# Patient Record
Sex: MALE | Race: WHITE | NOT HISPANIC OR LATINO | URBAN - METROPOLITAN AREA
[De-identification: names, ages, dates, MRNs, and addresses within clinical notes are randomized per-mention and may not be internally consistent; named-entity substitution may affect disease eponyms.]

---

## 2017-05-11 ENCOUNTER — OUTPATIENT (OUTPATIENT)
Dept: OUTPATIENT SERVICES | Facility: HOSPITAL | Age: 62
LOS: 1 days | End: 2017-05-11
Payer: MEDICARE

## 2017-05-11 DIAGNOSIS — M54.16 RADICULOPATHY, LUMBAR REGION: ICD-10-CM

## 2017-05-11 PROCEDURE — 62323 NJX INTERLAMINAR LMBR/SAC: CPT

## 2017-05-11 PROCEDURE — 77003 FLUOROGUIDE FOR SPINE INJECT: CPT

## 2017-06-26 ENCOUNTER — RX RENEWAL (OUTPATIENT)
Age: 62
End: 2017-06-26

## 2017-10-06 ENCOUNTER — RX RENEWAL (OUTPATIENT)
Age: 62
End: 2017-10-06

## 2018-01-08 ENCOUNTER — RX RENEWAL (OUTPATIENT)
Age: 63
End: 2018-01-08

## 2018-04-09 ENCOUNTER — RX RENEWAL (OUTPATIENT)
Age: 63
End: 2018-04-09

## 2018-04-19 ENCOUNTER — NON-APPOINTMENT (OUTPATIENT)
Age: 63
End: 2018-04-19

## 2018-04-19 ENCOUNTER — APPOINTMENT (OUTPATIENT)
Dept: INTERNAL MEDICINE | Facility: CLINIC | Age: 63
End: 2018-04-19
Payer: MEDICARE

## 2018-04-19 VITALS
SYSTOLIC BLOOD PRESSURE: 130 MMHG | HEART RATE: 81 BPM | DIASTOLIC BLOOD PRESSURE: 88 MMHG | TEMPERATURE: 97.6 F | HEIGHT: 73 IN | WEIGHT: 236 LBS | BODY MASS INDEX: 31.28 KG/M2 | OXYGEN SATURATION: 98 %

## 2018-04-19 DIAGNOSIS — Z00.00 ENCOUNTER FOR GENERAL ADULT MEDICAL EXAMINATION W/OUT ABNORMAL FINDINGS: ICD-10-CM

## 2018-04-19 DIAGNOSIS — Z82.49 FAMILY HISTORY OF ISCHEMIC HEART DISEASE AND OTHER DISEASES OF THE CIRCULATORY SYSTEM: ICD-10-CM

## 2018-04-19 DIAGNOSIS — Z82.3 FAMILY HISTORY OF STROKE: ICD-10-CM

## 2018-04-19 DIAGNOSIS — Z78.9 OTHER SPECIFIED HEALTH STATUS: ICD-10-CM

## 2018-04-19 PROCEDURE — 93000 ELECTROCARDIOGRAM COMPLETE: CPT

## 2018-04-19 PROCEDURE — G0439: CPT

## 2018-05-03 ENCOUNTER — APPOINTMENT (OUTPATIENT)
Dept: GASTROENTEROLOGY | Facility: CLINIC | Age: 63
End: 2018-05-03
Payer: MEDICARE

## 2018-05-03 VITALS
HEART RATE: 89 BPM | SYSTOLIC BLOOD PRESSURE: 130 MMHG | DIASTOLIC BLOOD PRESSURE: 82 MMHG | TEMPERATURE: 98.6 F | RESPIRATION RATE: 98 BRPM

## 2018-05-03 DIAGNOSIS — Z87.442 PERSONAL HISTORY OF URINARY CALCULI: ICD-10-CM

## 2018-05-03 PROCEDURE — 99203 OFFICE O/P NEW LOW 30 MIN: CPT

## 2018-05-03 RX ORDER — IBUPROFEN 800 MG/1
800 TABLET, FILM COATED ORAL
Qty: 60 | Refills: 3 | Status: DISCONTINUED | COMMUNITY
Start: 2018-04-19 | End: 2018-05-03

## 2018-05-10 ENCOUNTER — APPOINTMENT (OUTPATIENT)
Dept: GASTROENTEROLOGY | Facility: AMBULATORY MEDICAL SERVICES | Age: 63
End: 2018-05-10
Payer: MEDICARE

## 2018-05-10 PROCEDURE — G0121 COLON CA SCRN NOT HI RSK IND: CPT | Mod: PT

## 2018-05-10 PROCEDURE — 43239 EGD BIOPSY SINGLE/MULTIPLE: CPT

## 2018-05-14 ENCOUNTER — RX RENEWAL (OUTPATIENT)
Age: 63
End: 2018-05-14

## 2018-05-15 ENCOUNTER — APPOINTMENT (OUTPATIENT)
Dept: GASTROENTEROLOGY | Facility: AMBULATORY MEDICAL SERVICES | Age: 63
End: 2018-05-15

## 2018-05-22 ENCOUNTER — APPOINTMENT (OUTPATIENT)
Dept: INTERNAL MEDICINE | Facility: CLINIC | Age: 63
End: 2018-05-22
Payer: MEDICARE

## 2018-05-22 ENCOUNTER — TRANSCRIPTION ENCOUNTER (OUTPATIENT)
Age: 63
End: 2018-05-22

## 2018-05-22 VITALS
WEIGHT: 230 LBS | HEIGHT: 73 IN | DIASTOLIC BLOOD PRESSURE: 70 MMHG | HEART RATE: 82 BPM | TEMPERATURE: 98.2 F | OXYGEN SATURATION: 98 % | BODY MASS INDEX: 30.48 KG/M2 | SYSTOLIC BLOOD PRESSURE: 120 MMHG

## 2018-05-22 PROCEDURE — 99214 OFFICE O/P EST MOD 30 MIN: CPT

## 2018-05-22 RX ORDER — METHYLPREDNISOLONE 4 MG/1
4 TABLET ORAL
Qty: 21 | Refills: 0 | Status: COMPLETED | COMMUNITY
Start: 2018-01-31

## 2018-05-22 RX ORDER — SODIUM SULFATE, POTASSIUM SULFATE, MAGNESIUM SULFATE 17.5; 3.13; 1.6 G/ML; G/ML; G/ML
17.5-3.13-1.6 SOLUTION, CONCENTRATE ORAL
Qty: 1 | Refills: 0 | Status: COMPLETED | COMMUNITY
Start: 2018-05-03 | End: 2018-05-22

## 2018-05-22 NOTE — ASSESSMENT
[Patient Optimized for Surgery] : Patient optimized for surgery [No Further Testing Recommended] : no further testing recommended [FreeTextEntry6] : Patient advised to avoid all over-the-counter Aspirin or NSAID medications.

## 2018-05-22 NOTE — PHYSICAL EXAM
[No Acute Distress] : no acute distress [Well Nourished] : well nourished [Well Developed] : well developed [Well-Appearing] : well-appearing [Normal Sclera/Conjunctiva] : normal sclera/conjunctiva [PERRL] : pupils equal round and reactive to light [EOMI] : extraocular movements intact [Normal Outer Ear/Nose] : the outer ears and nose were normal in appearance [Normal Oropharynx] : the oropharynx was normal [No JVD] : no jugular venous distention [Supple] : supple [No Lymphadenopathy] : no lymphadenopathy [Thyroid Normal, No Nodules] : the thyroid was normal and there were no nodules present [No Respiratory Distress] : no respiratory distress  [Clear to Auscultation] : lungs were clear to auscultation bilaterally [No Accessory Muscle Use] : no accessory muscle use [Normal Rate] : normal rate  [Regular Rhythm] : with a regular rhythm [Normal S1, S2] : normal S1 and S2 [No Murmur] : no murmur heard [No Carotid Bruits] : no carotid bruits [No Edema] : there was no peripheral edema [Soft] : abdomen soft [Non Tender] : non-tender [Non-distended] : non-distended [No Masses] : no abdominal mass palpated [No HSM] : no HSM [Normal Bowel Sounds] : normal bowel sounds [Normal Posterior Cervical Nodes] : no posterior cervical lymphadenopathy [Normal Anterior Cervical Nodes] : no anterior cervical lymphadenopathy [No CVA Tenderness] : no CVA  tenderness [No Spinal Tenderness] : no spinal tenderness [No Joint Swelling] : no joint swelling [Grossly Normal Strength/Tone] : grossly normal strength/tone [No Rash] : no rash [Normal Gait] : normal gait [Coordination Grossly Intact] : coordination grossly intact [No Focal Deficits] : no focal deficits [Deep Tendon Reflexes (DTR)] : deep tendon reflexes were 2+ and symmetric [Normal Affect] : the affect was normal [Normal Insight/Judgement] : insight and judgment were intact

## 2018-05-22 NOTE — CONSULT LETTER
[Courtesy Letter:] : I had the pleasure of seeing your patient, [unfilled], in my office today. [Please see my note below.] : Please see my note below. [Consult Closing:] : Thank you very much for allowing me to participate in the care of this patient.  If you have any questions, please do not hesitate to contact me.

## 2018-05-22 NOTE — HISTORY OF PRESENT ILLNESS
[( Patient denies any chest pain, claudication, dyspnea on exertion, orthopnea, palpitations or syncope )] : Patient denies any chest pain, claudication, dyspnea on exertion, orthopnea, palpitations or syncope. [Coronary Artery Disease] : no coronary artery disease [Diabetes] : no diabetes [Sleep Apnea] : no sleep apnea [COPD] : no COPD [Previous Adverse Anesthesia Reaction] : no previous adverse anesthesia reaction [FreeTextEntry1] : Arthroscopy of right shoulder [FreeTextEntry2] : 5/30/2018 [FreeTextEntry3] : Dr. Heraclio Mary

## 2018-05-22 NOTE — RESULTS/DATA
[] : results reviewed [de-identified] : within normal limits [de-identified] : within normal limits [de-identified] : Normal sinus rhythm @ 67bpm, incomplete RBBB, no acute st-t wave changes.

## 2018-06-05 ENCOUNTER — APPOINTMENT (OUTPATIENT)
Dept: GASTROENTEROLOGY | Facility: CLINIC | Age: 63
End: 2018-06-05
Payer: MEDICARE

## 2018-06-05 VITALS
HEART RATE: 81 BPM | BODY MASS INDEX: 30.88 KG/M2 | SYSTOLIC BLOOD PRESSURE: 136 MMHG | HEIGHT: 73 IN | OXYGEN SATURATION: 98 % | DIASTOLIC BLOOD PRESSURE: 88 MMHG | WEIGHT: 233 LBS | TEMPERATURE: 98.3 F

## 2018-06-05 PROCEDURE — 99213 OFFICE O/P EST LOW 20 MIN: CPT

## 2018-06-05 RX ORDER — OXYCODONE AND ACETAMINOPHEN 5; 325 MG/1; MG/1
5-325 TABLET ORAL
Qty: 20 | Refills: 0 | Status: DISCONTINUED | COMMUNITY
Start: 2018-05-21 | End: 2018-06-05

## 2018-07-02 ENCOUNTER — RX RENEWAL (OUTPATIENT)
Age: 63
End: 2018-07-02

## 2018-08-09 ENCOUNTER — RX RENEWAL (OUTPATIENT)
Age: 63
End: 2018-08-09

## 2018-12-06 ENCOUNTER — APPOINTMENT (OUTPATIENT)
Dept: INTERNAL MEDICINE | Facility: CLINIC | Age: 63
End: 2018-12-06
Payer: MEDICARE

## 2018-12-06 VITALS
WEIGHT: 228 LBS | HEART RATE: 78 BPM | BODY MASS INDEX: 30.22 KG/M2 | DIASTOLIC BLOOD PRESSURE: 80 MMHG | HEIGHT: 73 IN | TEMPERATURE: 97.8 F | SYSTOLIC BLOOD PRESSURE: 130 MMHG | OXYGEN SATURATION: 98 %

## 2018-12-06 DIAGNOSIS — M25.512 PAIN IN LEFT SHOULDER: ICD-10-CM

## 2018-12-06 DIAGNOSIS — Z12.11 ENCOUNTER FOR SCREENING FOR MALIGNANT NEOPLASM OF COLON: ICD-10-CM

## 2018-12-06 PROCEDURE — 99215 OFFICE O/P EST HI 40 MIN: CPT

## 2018-12-06 NOTE — REVIEW OF SYSTEMS
[Negative] : Heme/Lymph [Joint Pain] : joint pain [Joint Stiffness] : joint stiffness [Back Pain] : back pain

## 2018-12-06 NOTE — HISTORY OF PRESENT ILLNESS
[No Pertinent Cardiac History] : no history of aortic stenosis, atrial fibrillation, coronary artery disease, recent myocardial infarction, or implantable device/pacemaker [No Pertinent Pulmonary History] : no history of asthma, COPD, sleep apnea, or smoking [No Adverse Anesthesia Reaction] : no adverse anesthesia reaction in self or family member [(Patient denies any chest pain, claudication, dyspnea on exertion, orthopnea, palpitations or syncope)] : Patient denies any chest pain, claudication, dyspnea on exertion, orthopnea, palpitations or syncope [Diabetes] : diabetes [Chronic Anticoagulation] : no chronic anticoagulation [Chronic Kidney Disease] : no chronic kidney disease [FreeTextEntry1] : right rotator cuff  repair [FreeTextEntry2] : 12/11/18 [FreeTextEntry3] : Dr. Chow

## 2018-12-06 NOTE — ASSESSMENT
[Patient Optimized for Surgery] : Patient optimized for surgery [No Further Testing Recommended] : no further testing recommended [Continue medications as is] : Continue current medications

## 2018-12-06 NOTE — RESULTS/DATA
[] : results reviewed [de-identified] : within normal limits [de-identified] : within normal limits [de-identified] : NSR@ 71 bpm, incomplete RBBB, no acute st-t wave changes.

## 2018-12-06 NOTE — PHYSICAL EXAM
[No Acute Distress] : no acute distress [Well Nourished] : well nourished [Well Developed] : well developed [Well-Appearing] : well-appearing [Normal Sclera/Conjunctiva] : normal sclera/conjunctiva [PERRL] : pupils equal round and reactive to light [EOMI] : extraocular movements intact [Normal Outer Ear/Nose] : the outer ears and nose were normal in appearance [Normal Oropharynx] : the oropharynx was normal [Normal TMs] : both tympanic membranes were normal [No JVD] : no jugular venous distention [Supple] : supple [No Lymphadenopathy] : no lymphadenopathy [Thyroid Normal, No Nodules] : the thyroid was normal and there were no nodules present [No Respiratory Distress] : no respiratory distress  [Clear to Auscultation] : lungs were clear to auscultation bilaterally [No Accessory Muscle Use] : no accessory muscle use [Normal Rate] : normal rate  [Regular Rhythm] : with a regular rhythm [Normal S1, S2] : normal S1 and S2 [No Murmur] : no murmur heard [No Carotid Bruits] : no carotid bruits [No Varicosities] : no varicosities [Pedal Pulses Present] : the pedal pulses are present [No Edema] : there was no peripheral edema [No Extremity Clubbing/Cyanosis] : no extremity clubbing/cyanosis [Soft] : abdomen soft [Non Tender] : non-tender [Non-distended] : non-distended [No Masses] : no abdominal mass palpated [No HSM] : no HSM [Normal Bowel Sounds] : normal bowel sounds [Normal Posterior Cervical Nodes] : no posterior cervical lymphadenopathy [Normal Anterior Cervical Nodes] : no anterior cervical lymphadenopathy [No CVA Tenderness] : no CVA  tenderness [No Spinal Tenderness] : no spinal tenderness [No Joint Swelling] : no joint swelling [Grossly Normal Strength/Tone] : grossly normal strength/tone [No Rash] : no rash [Normal Gait] : normal gait [Coordination Grossly Intact] : coordination grossly intact [No Focal Deficits] : no focal deficits [Normal Affect] : the affect was normal [Alert and Oriented x3] : oriented to person, place, and time [Normal Insight/Judgement] : insight and judgment were intact [de-identified] : limited range of motion in neck and right shoulder

## 2018-12-06 NOTE — CONSULT LETTER
[Dear  ___] : Dear  [unfilled], [Courtesy Letter:] : I had the pleasure of seeing your patient, [unfilled], in my office today. [Please see my note below.] : Please see my note below. [Consult Closing:] : Thank you very much for allowing me to participate in the care of this patient.  If you have any questions, please do not hesitate to contact me. [Sincerely,] : Sincerely,

## 2018-12-25 ENCOUNTER — RX RENEWAL (OUTPATIENT)
Age: 63
End: 2018-12-25

## 2019-05-23 ENCOUNTER — OUTPATIENT (OUTPATIENT)
Dept: OUTPATIENT SERVICES | Facility: HOSPITAL | Age: 64
LOS: 1 days | End: 2019-05-23

## 2019-05-23 ENCOUNTER — TRANSCRIPTION ENCOUNTER (OUTPATIENT)
Age: 64
End: 2019-05-23

## 2019-06-19 ENCOUNTER — APPOINTMENT (OUTPATIENT)
Dept: DERMATOLOGY | Facility: CLINIC | Age: 64
End: 2019-06-19

## 2019-06-20 ENCOUNTER — OUTPATIENT (OUTPATIENT)
Dept: OUTPATIENT SERVICES | Facility: HOSPITAL | Age: 64
LOS: 1 days | End: 2019-06-20

## 2019-07-18 ENCOUNTER — OUTPATIENT (OUTPATIENT)
Dept: OUTPATIENT SERVICES | Facility: HOSPITAL | Age: 64
LOS: 1 days | End: 2019-07-18

## 2019-12-23 ENCOUNTER — RX RENEWAL (OUTPATIENT)
Age: 64
End: 2019-12-23

## 2020-01-20 ENCOUNTER — RX RENEWAL (OUTPATIENT)
Age: 65
End: 2020-01-20

## 2020-02-18 ENCOUNTER — RX RENEWAL (OUTPATIENT)
Age: 65
End: 2020-02-18

## 2020-04-22 ENCOUNTER — RX RENEWAL (OUTPATIENT)
Age: 65
End: 2020-04-22

## 2020-06-22 ENCOUNTER — RX RENEWAL (OUTPATIENT)
Age: 65
End: 2020-06-22

## 2020-10-19 ENCOUNTER — APPOINTMENT (OUTPATIENT)
Dept: DERMATOLOGY | Facility: CLINIC | Age: 65
End: 2020-10-19

## 2020-11-28 ENCOUNTER — OUTPATIENT (OUTPATIENT)
Dept: OUTPATIENT SERVICES | Facility: HOSPITAL | Age: 65
LOS: 1 days | End: 2020-11-28

## 2021-01-10 ENCOUNTER — APPOINTMENT (OUTPATIENT)
Dept: DISASTER EMERGENCY | Facility: CLINIC | Age: 66
End: 2021-01-10

## 2021-01-12 LAB — SARS-COV-2 N GENE NPH QL NAA+PROBE: NOT DETECTED

## 2021-01-13 ENCOUNTER — OUTPATIENT (OUTPATIENT)
Dept: OUTPATIENT SERVICES | Facility: HOSPITAL | Age: 66
LOS: 1 days | End: 2021-01-13

## 2021-08-30 ENCOUNTER — OUTPATIENT (OUTPATIENT)
Dept: OUTPATIENT SERVICES | Facility: HOSPITAL | Age: 66
LOS: 1 days | End: 2021-08-30

## 2022-04-28 ENCOUNTER — EMERGENCY (EMERGENCY)
Facility: HOSPITAL | Age: 67
LOS: 1 days | Discharge: HOME | End: 2022-04-28
Admitting: EMERGENCY MEDICINE
Payer: COMMERCIAL

## 2022-04-28 DIAGNOSIS — E11.9 TYPE 2 DIABETES MELLITUS WITHOUT COMPLICATIONS: ICD-10-CM

## 2022-04-28 DIAGNOSIS — R42 DIZZINESS AND GIDDINESS: ICD-10-CM

## 2022-04-28 DIAGNOSIS — R00.2 PALPITATIONS: ICD-10-CM

## 2022-04-28 PROCEDURE — 93010 ELECTROCARDIOGRAM REPORT: CPT

## 2022-04-28 PROCEDURE — 99285 EMERGENCY DEPT VISIT HI MDM: CPT | Mod: FS

## 2022-04-28 PROCEDURE — 99284 EMERGENCY DEPT VISIT MOD MDM: CPT

## 2022-04-28 PROCEDURE — 71045 X-RAY EXAM CHEST 1 VIEW: CPT | Mod: 26

## 2022-05-02 ENCOUNTER — NON-APPOINTMENT (OUTPATIENT)
Age: 67
End: 2022-05-02

## 2022-05-02 ENCOUNTER — APPOINTMENT (OUTPATIENT)
Dept: ELECTROPHYSIOLOGY | Facility: CLINIC | Age: 67
End: 2022-05-02
Payer: MEDICARE

## 2022-05-02 ENCOUNTER — APPOINTMENT (OUTPATIENT)
Dept: CARDIOLOGY | Facility: CLINIC | Age: 67
End: 2022-05-02
Payer: MEDICARE

## 2022-05-02 VITALS
HEART RATE: 122 BPM | TEMPERATURE: 97.8 F | OXYGEN SATURATION: 97 % | DIASTOLIC BLOOD PRESSURE: 60 MMHG | BODY MASS INDEX: 28.23 KG/M2 | WEIGHT: 213 LBS | HEIGHT: 73 IN | SYSTOLIC BLOOD PRESSURE: 80 MMHG

## 2022-05-02 VITALS
SYSTOLIC BLOOD PRESSURE: 80 MMHG | TEMPERATURE: 97.8 F | BODY MASS INDEX: 28.1 KG/M2 | HEART RATE: 122 BPM | DIASTOLIC BLOOD PRESSURE: 60 MMHG | OXYGEN SATURATION: 97 % | WEIGHT: 213 LBS

## 2022-05-02 DIAGNOSIS — M75.101 UNSPECIFIED ROTATOR CUFF TEAR OR RUPTURE OF RIGHT SHOULDER, NOT SPECIFIED AS TRAUMATIC: ICD-10-CM

## 2022-05-02 DIAGNOSIS — Z01.818 ENCOUNTER FOR OTHER PREPROCEDURAL EXAMINATION: ICD-10-CM

## 2022-05-02 DIAGNOSIS — Z87.39 PERSONAL HISTORY OF OTHER DISEASES OF THE MUSCULOSKELETAL SYSTEM AND CONNECTIVE TISSUE: ICD-10-CM

## 2022-05-02 DIAGNOSIS — G89.29 PERSONAL HISTORY OF OTHER DISEASES OF THE MUSCULOSKELETAL SYSTEM AND CONNECTIVE TISSUE: ICD-10-CM

## 2022-05-02 DIAGNOSIS — Z87.19 PERSONAL HISTORY OF OTHER DISEASES OF THE DIGESTIVE SYSTEM: ICD-10-CM

## 2022-05-02 DIAGNOSIS — M25.511 PAIN IN RIGHT SHOULDER: ICD-10-CM

## 2022-05-02 PROCEDURE — 99215 OFFICE O/P EST HI 40 MIN: CPT

## 2022-05-02 PROCEDURE — 93000 ELECTROCARDIOGRAM COMPLETE: CPT

## 2022-05-02 PROCEDURE — 99204 OFFICE O/P NEW MOD 45 MIN: CPT

## 2022-05-02 RX ORDER — METOPROLOL SUCCINATE 25 MG/1
25 TABLET, EXTENDED RELEASE ORAL DAILY
Qty: 30 | Refills: 3 | Status: DISCONTINUED | COMMUNITY
End: 2022-05-02

## 2022-05-02 RX ORDER — METFORMIN HYDROCHLORIDE 500 MG/1
500 TABLET, COATED ORAL
Qty: 90 | Refills: 0 | Status: ACTIVE | COMMUNITY

## 2022-05-02 NOTE — ASSESSMENT
[FreeTextEntry1] : Paroxysmal atrial flutter -no room to increase beta-blocker or calcium channel blocker given blood pressure systolic 80 .  This option for him is probably ablation; I have referred him to Dr. Cody for EP evaluation; we will start him on amiodarone if needed for rate control..  His TSH was 2.38 in the hospital.  His wife denies signal symptoms of sleep apnea syndrome.  He will need normal invasive CAD work-up.  Echocardiogram has been requested.\par \par Diabetes -long-term goals blood pressure less than 130/80, A1c less than 7, LDL 70; diabetic diet; continue medications.  SGLT 2 inhibitor should be considered for cardiovascular effects.\par \par Dyslipidemia -low-cholesterol, continue medications.\par \par Overweight status -weight reduction diet and exercise.\par \par Risk factor modifier has been discussed with him at great length including regular walking, compliance to medication, low-cholesterol/low-salt/diabetic diet.  He will be reeval by me after cardiac testing.

## 2022-05-02 NOTE — HISTORY OF PRESENT ILLNESS
[FreeTextEntry1] : 66 years old  gentleman with history of type 2 diabetes, dyslipidemia, history of VSD repair at at age 8 came for urgent cardiac follow-up after discharge from a [facility], still having tachycardia and palpitations.\par \par He was seen in [facility] on April 28, 2022, I have reviewed the hospital records; he was admitted for tachycardia was found to be in atrial fibrillation converted normal sinus spontaneously, he was started on Eliquis and he had appointment to see me today.  Early 4 AM he developed tachycardia, palpitations.  He has no chest pain..\par \par Denies PND, orthopnea, diaphoresis, dizziness, claudication, pedal edema..\par \par No pressure CHF, MI, syncope.

## 2022-05-09 ENCOUNTER — NON-APPOINTMENT (OUTPATIENT)
Age: 67
End: 2022-05-09

## 2022-05-10 ENCOUNTER — APPOINTMENT (OUTPATIENT)
Dept: CARDIOLOGY | Facility: CLINIC | Age: 67
End: 2022-05-10
Payer: MEDICARE

## 2022-05-10 ENCOUNTER — NON-APPOINTMENT (OUTPATIENT)
Age: 67
End: 2022-05-10

## 2022-05-10 VITALS
OXYGEN SATURATION: 97 % | HEIGHT: 73 IN | SYSTOLIC BLOOD PRESSURE: 122 MMHG | HEART RATE: 64 BPM | DIASTOLIC BLOOD PRESSURE: 70 MMHG | WEIGHT: 212 LBS | BODY MASS INDEX: 28.1 KG/M2 | RESPIRATION RATE: 14 BRPM | TEMPERATURE: 97 F

## 2022-05-10 PROCEDURE — 99215 OFFICE O/P EST HI 40 MIN: CPT

## 2022-05-10 PROCEDURE — 93306 TTE W/DOPPLER COMPLETE: CPT

## 2022-05-10 PROCEDURE — 93000 ELECTROCARDIOGRAM COMPLETE: CPT

## 2022-05-11 ENCOUNTER — OUTPATIENT (OUTPATIENT)
Dept: OUTPATIENT SERVICES | Facility: HOSPITAL | Age: 67
LOS: 1 days | End: 2022-05-11
Payer: MEDICARE

## 2022-05-11 PROCEDURE — 93312 ECHO TRANSESOPHAGEAL: CPT | Mod: 26

## 2022-05-11 PROCEDURE — 93010 ELECTROCARDIOGRAM REPORT: CPT

## 2022-05-11 PROCEDURE — 92960 CARDIOVERSION ELECTRIC EXT: CPT

## 2022-05-11 PROCEDURE — 93320 DOPPLER ECHO COMPLETE: CPT | Mod: 26

## 2022-05-11 PROCEDURE — 93325 DOPPLER ECHO COLOR FLOW MAPG: CPT | Mod: 26

## 2022-05-15 NOTE — REVIEW OF SYSTEMS
[Blurry Vision] : no blurred vision [Feeling Fatigued] : not feeling fatigued [Sore Throat] : no sore throat [SOB] : no shortness of breath [Chest Discomfort] : no chest discomfort [Palpitations] : palpitations [Cough] : no cough [Abdominal Pain] : no abdominal pain [Dizziness] : no dizziness [Easy Bleeding] : no tendency for easy bleeding

## 2022-05-15 NOTE — HISTORY OF PRESENT ILLNESS
[FreeTextEntry1] : The patient is a 66-year-old man who is seen in evaluation status post recent hospital presentation for tachycardia that he was noted on his heart rate monitor on his watch.  He also was  lightheaded.  The patient had presented to the emergency room with complaints of lightheadedness as well as the palpitations.  In the ER he converted to sinus rhythm during the placement of an intravenous line. \par \par Prior to last ER presentation,  the patient was not having problems with his heartbeat.  He was started on Eliquis as well as metoprolol\par \par He is feeling tired today.  He is not having any dizziness, shortness of breath or chest pain.  \par \par The patient describes his heartbeat as varying it races then for about 5 beats  goes back to normal and then it races again. \par The patient has no prior history of hypertension.  He has a history of diabetes.\par \par No prior history of lung disease.  He drinks very little alcohol.  Drinks about 2 cups of caffeine per day.\par \par \par \par Patient has no other known medical problems. He has no thyroid issues.  He has had prior history of kidney stone.\par \par He has had multiple prior surgeries including shoulder, elbow and cervical spine.  He had a history of a VSD repair during childhood.  It was done at [facility] in [place]. \par The patient had a COVID infection  about 3 weeks ago.  Prior to that he was completely well. He had presented with a cough and some sore throat and he was treated conservatively and improved.  His atrial arrhythmias started about 2 weeks after his COVID presentation.\par \par  [id number] score: age > 65, DM\par AF risk factors: increase BMI, DM, GERD\par \par Echocardiogram pending.\par \par Stress test pending\par \par Thyroid function test TSH 2.38.\par \par \par

## 2022-05-15 NOTE — DISCUSSION/SUMMARY
[FreeTextEntry1] : Patient is a 56-year-old man with a prior history of diabetes, dyslipidemia, previous history of VSD repair at age 8.  He had developed COVID infection and then subsequently presented with atrial fibrillation 2 weeks later.  He spontaneously converted.  Patient has been on anticoagulation Eliquis.\par \par He has recurrent atrial fibrillation.\par \par We discussed the treatment options of antiarrhythmic therapy versus cardioversion versus catheter ablation.  Because of the implications regarding his COVID the patient feels its related and would prefer to try medical therapy for proceeding with possible catheter ablation.  We discussed possible options to hold sinus rhythm including usage of antiarrhythmic.  Different antiarrhythmics were discussed.  Amiodarone was discussed including side effect profile and black box warning.  Patient understands the medication including potential side effects.  We will start an amiodarone 200 mg twice daily for 7 days and then 1 tablet daily.  If he does not self convert in his regimen we will arrange for a PAMELA cardioversion.  Patient will be seen in follow-up subsequently especially if he has recurrent atrial fibrillation to discuss catheter ablation.  We will also do follow-up monitoring.  Await echocardiogram.\par \par Follow-up with electrophysiology after conversion either with with antiarrhythmic or PAMELA cardioversion.

## 2022-05-15 NOTE — CARDIOLOGY SUMMARY
Spoke with Dr. Dar Ortiz. Would like pt to do Nascobal nasal spray 1x/week for 4 weeks and then to continue on with 1000mcg B12. Recommended pt also start Bariatric Chewable vitamins. Pt appreciative of call. [de-identified] : A. fib/flutter/atrial tachycardia

## 2022-05-15 NOTE — PHYSICAL EXAM
[No Acute Distress] : no acute distress [Normal Conjunctiva] : normal conjunctiva [Normal Venous Pressure] : normal venous pressure [Normal S1, S2] : normal S1, S2 [No Murmur] : no murmur [No Rub] : no rub [Good Air Entry] : good air entry [Soft] : abdomen soft [Normal Gait] : normal gait [No Edema] : no edema [No Focal Deficits] : no focal deficits [Alert and Oriented] : alert and oriented [de-identified] : Irregular

## 2022-05-16 DIAGNOSIS — Z79.84 LONG TERM (CURRENT) USE OF ORAL HYPOGLYCEMIC DRUGS: ICD-10-CM

## 2022-05-16 DIAGNOSIS — I48.0 PAROXYSMAL ATRIAL FIBRILLATION: ICD-10-CM

## 2022-05-16 DIAGNOSIS — K21.9 GASTRO-ESOPHAGEAL REFLUX DISEASE WITHOUT ESOPHAGITIS: ICD-10-CM

## 2022-05-16 DIAGNOSIS — I08.1 RHEUMATIC DISORDERS OF BOTH MITRAL AND TRICUSPID VALVES: ICD-10-CM

## 2022-05-16 DIAGNOSIS — Z79.01 LONG TERM (CURRENT) USE OF ANTICOAGULANTS: ICD-10-CM

## 2022-05-16 DIAGNOSIS — E78.5 HYPERLIPIDEMIA, UNSPECIFIED: ICD-10-CM

## 2022-05-16 DIAGNOSIS — I70.0 ATHEROSCLEROSIS OF AORTA: ICD-10-CM

## 2022-05-16 DIAGNOSIS — E11.9 TYPE 2 DIABETES MELLITUS WITHOUT COMPLICATIONS: ICD-10-CM

## 2022-05-27 ENCOUNTER — RX CHANGE (OUTPATIENT)
Age: 67
End: 2022-05-27

## 2022-05-27 ENCOUNTER — NON-APPOINTMENT (OUTPATIENT)
Age: 67
End: 2022-05-27

## 2022-05-27 ENCOUNTER — APPOINTMENT (OUTPATIENT)
Dept: CARDIOLOGY | Facility: CLINIC | Age: 67
End: 2022-05-27
Payer: MEDICARE

## 2022-05-27 VITALS
OXYGEN SATURATION: 97 % | WEIGHT: 208 LBS | SYSTOLIC BLOOD PRESSURE: 98 MMHG | HEIGHT: 73 IN | DIASTOLIC BLOOD PRESSURE: 60 MMHG | BODY MASS INDEX: 27.57 KG/M2 | TEMPERATURE: 96.9 F | HEART RATE: 107 BPM

## 2022-05-27 PROCEDURE — 99214 OFFICE O/P EST MOD 30 MIN: CPT

## 2022-05-27 RX ORDER — DILTIAZEM HYDROCHLORIDE 120 MG/1
120 TABLET, EXTENDED RELEASE ORAL DAILY
Qty: 90 | Refills: 1 | Status: DISCONTINUED | COMMUNITY
Start: 2022-05-27 | End: 2022-05-27

## 2022-05-27 NOTE — HISTORY OF PRESENT ILLNESS
[FreeTextEntry1] : Atrial fibrillation: The patient states he feels he was in normal sinus rhythm till yesterday.  Then his atrial fibrillation recurred.  He felt his heart racing.  His watch indicated that his heart rate was high.  He took his pulse.  Was approximately 130.  There is been no chest discomfort no shortness of breath.  Today we started Cardizem  mg daily.  He will take it for a week and if he is still feeling that his heart rate is not well controlled he can self uptitrate to 240 mg daily.  Continue Eliquis therapy.  Continue amiodarone at this time.  The risk and benefits of DC cardioversion have been reviewed.  Overall the patient wishes to continue pharmacologic therapy at this time and consider cardioversion in the future.\par \par Mitral insufficiency: The patient has moderate mitral insufficiency.  Left atrial size is 4.2.  The risk and benefits of atrial fibrillation ablation therapy have been reviewed.  The patient will be considering this.

## 2022-05-27 NOTE — ASSESSMENT
[FreeTextEntry1] : Atrial fibrillation: The patient states he feels he was in normal sinus rhythm till yesterday.  Then his atrial fibrillation recurred.  He felt his heart racing.  His watch indicated that his heart rate was high.  He took his pulse.  Was approximately 130.  There is been no chest discomfort no shortness of breath.  Today we started Cardizem  mg daily.  He will take it for a week and if he is still feeling that his heart rate is not well controlled he can self uptitrate to 240 mg daily.  Continue Eliquis therapy.  Continue amiodarone at this time.  The risk and benefits of DC cardioversion have been reviewed.  Overall the patient wishes to continue pharmacologic therapy at this time and consider cardioversion in the future.\par \par Electrolytes and thyroid function testing were normal during his first bout of atrial fibrillation.\par \par Mitral insufficiency: The patient has moderate mitral insufficiency.  Left atrial size is 4.2.  The risk and benefits of atrial fibrillation ablation therapy have been reviewed.  The patient will be considering this.\par \par Echocardiography, previous notes and labs reviewed.

## 2022-05-31 RX ORDER — DILTIAZEM HYDROCHLORIDE 120 MG/1
120 CAPSULE, EXTENDED RELEASE ORAL DAILY
Qty: 90 | Refills: 0 | Status: COMPLETED | COMMUNITY
Start: 2022-05-27 | End: 2022-05-27

## 2022-06-02 ENCOUNTER — RX CHANGE (OUTPATIENT)
Age: 67
End: 2022-06-02

## 2022-06-08 ENCOUNTER — APPOINTMENT (OUTPATIENT)
Dept: CARDIOLOGY | Facility: CLINIC | Age: 67
End: 2022-06-08
Payer: MEDICARE

## 2022-06-08 ENCOUNTER — NON-APPOINTMENT (OUTPATIENT)
Age: 67
End: 2022-06-08

## 2022-06-08 ENCOUNTER — APPOINTMENT (OUTPATIENT)
Dept: ELECTROPHYSIOLOGY | Facility: CLINIC | Age: 67
End: 2022-06-08
Payer: MEDICARE

## 2022-06-08 VITALS
HEIGHT: 73 IN | OXYGEN SATURATION: 97 % | HEART RATE: 65 BPM | WEIGHT: 208 LBS | DIASTOLIC BLOOD PRESSURE: 70 MMHG | BODY MASS INDEX: 27.57 KG/M2 | SYSTOLIC BLOOD PRESSURE: 122 MMHG | TEMPERATURE: 97.3 F

## 2022-06-08 VITALS
HEART RATE: 65 BPM | WEIGHT: 208 LBS | OXYGEN SATURATION: 97 % | TEMPERATURE: 97.3 F | SYSTOLIC BLOOD PRESSURE: 122 MMHG | HEIGHT: 73 IN | BODY MASS INDEX: 27.57 KG/M2 | DIASTOLIC BLOOD PRESSURE: 70 MMHG

## 2022-06-08 PROCEDURE — 99214 OFFICE O/P EST MOD 30 MIN: CPT

## 2022-06-08 PROCEDURE — 93000 ELECTROCARDIOGRAM COMPLETE: CPT

## 2022-06-08 RX ORDER — DILTIAZEM HYDROCHLORIDE 180 MG/1
180 CAPSULE, EXTENDED RELEASE ORAL
Qty: 90 | Refills: 1 | Status: COMPLETED | COMMUNITY
Start: 2022-06-08 | End: 2022-06-08

## 2022-06-08 RX ORDER — AMIODARONE HYDROCHLORIDE 200 MG/1
200 TABLET ORAL DAILY
Qty: 90 | Refills: 1 | Status: DISCONTINUED | COMMUNITY
Start: 2022-05-02 | End: 2022-06-08

## 2022-06-08 RX ORDER — DILTIAZEM HYDROCHLORIDE 120 MG/1
120 CAPSULE, EXTENDED RELEASE ORAL
Qty: 90 | Refills: 0 | Status: DISCONTINUED | COMMUNITY
Start: 2022-05-27 | End: 2022-06-08

## 2022-06-08 NOTE — HISTORY OF PRESENT ILLNESS
[FreeTextEntry1] : Atrial fibrillation: The patient had 1 recurrence.  Today the patient is in normal sinus rhythm.  He did feel slightly lightheaded during atrial fibrillation.  There was no other symptoms.  Overall have recommended an attempt at up titration of diltiazem from 120 daily to 240 daily.  I would favor attempt off of amiodarone therapy with rate control using calcium channel blockers or beta-blockers.  The risk and benefits of ablation therapy have been reviewed.  The patient is meeting with an electrophysiologist later today to review.\par \par Mitral regurgitation: The patient has mild to moderate MR by transesophageal echocardiography.  The patient is asymptomatic in this regard.  Left atrium measured 4.2 cm on echocardiography in May 2022.

## 2022-06-08 NOTE — ASSESSMENT
[FreeTextEntry1] : Atrial fibrillation: The patient had 1 recurrence.  Today the patient is in normal sinus rhythm.  He did feel slightly lightheaded during atrial fibrillation.  There was no other symptoms.  Overall have recommended an attempt at up titration of diltiazem from 120 daily to 240 daily.  I would favor attempt off of amiodarone therapy with rate control using calcium channel blockers or beta-blockers.  The risk and benefits of ablation therapy have been reviewed.  The patient is meeting with an electrophysiologist later today to review.\par \par Mitral regurgitation: The patient has mild to moderate MR by transesophageal echocardiography.  The patient is asymptomatic in this regard.  Left atrium measured 4.2 cm on echocardiography in May 2022.\par \par Transthoracic echocardiography, transesophageal echocardiography, ECG reviewed.\par \par

## 2022-06-11 NOTE — PHYSICAL EXAM
[No Acute Distress] : no acute distress [Normal Conjunctiva] : normal conjunctiva [Normal Venous Pressure] : normal venous pressure [Normal S1, S2] : normal S1, S2 [No Murmur] : no murmur [No Rub] : no rub [Good Air Entry] : good air entry [Soft] : abdomen soft [Normal Gait] : normal gait [No Edema] : no edema [No Focal Deficits] : no focal deficits [Alert and Oriented] : alert and oriented [de-identified] : Irregular

## 2022-06-11 NOTE — REVIEW OF SYSTEMS
[Palpitations] : palpitations [Feeling Fatigued] : not feeling fatigued [Blurry Vision] : no blurred vision [Sore Throat] : no sore throat [SOB] : no shortness of breath [Chest Discomfort] : no chest discomfort [Cough] : no cough [Abdominal Pain] : no abdominal pain [Dizziness] : no dizziness [Easy Bleeding] : no tendency for easy bleeding

## 2022-06-11 NOTE — DISCUSSION/SUMMARY
[FreeTextEntry1] : The patient is having frequent episodes of A. fib.  Question triggered after COVID infection.  He was previously on amiodarone which was discontinued.  He is now on Cardizem.  We will see how he does over the next few months and should he have recurrent atrial fibrillation will we discussed the options of antiarrhythmic versus catheter ablation.  Patient also would prefer to wait and see how he does.  I discussed with him that other options for antiarrhythmic including Multaq or propafenone.\par \par We discussed ablation procedure and potential risk given history of Almanzar's esophagus.  He would get a GI assessment and we will discuss further before considering ablation option.  Other options might be chronic cath ablation versus a pulsed field ablation which is still an research trials phase [place].\par We will continue current medication including anticoagulation and reassess in the next 3 months.\par

## 2022-06-11 NOTE — HISTORY OF PRESENT ILLNESS
[FreeTextEntry1] : The patient is a 66-year-old man who is seen in follow-up evaluation for his atrial fibrillation.  He was accompanied by his spouse.  He was previously seen by me in May 2022 and at that time he had recent cardioversion and was placed on amiodarone.  As noted the patient never had A. fib prior to his COVID infection.\par \par Since last seen he had 1 episode of A. fib where his heart rate was noted around 130.  He saw Dr. Giron today and  amiodarone was discontinued and his dose of Cardizem was increased.\par \par Is currently feeling well denies any chest pain, shortness of breath, dizziness, lightness, syncope or presyncope.  He has no exertional symptoms.\par \par \par The patient has no prior history of hypertension.  He has a history of diabetes. No prior history of lung disease.  He drinks very little alcohol.  Drinks about 2 cups of caffeine per day.\par Patient has no other known medical problems. He has no thyroid issues.  He has had prior history of kidney stone.\par \par He has had multiple prior surgeries including shoulder, elbow and cervical spine.  He had a history of a VSD repair during childhood.  It was done at [facility] in [place]. \par The patient had a COVID infection April 2022.  Prior to that he was completely well. He had presented with a cough and some sore throat and he was treated conservatively and improved.  His atrial arrhythmias started about 2 weeks after his COVID presentation.\par \par  [id number] score: age > 65, DM\par AF risk factors: increase BMI, DM, GERD\par \par Thyroid function test TSH 2.38.\par \par \par

## 2022-06-13 ENCOUNTER — NON-APPOINTMENT (OUTPATIENT)
Age: 67
End: 2022-06-13

## 2022-08-24 ENCOUNTER — NON-APPOINTMENT (OUTPATIENT)
Age: 67
End: 2022-08-24

## 2022-08-24 ENCOUNTER — APPOINTMENT (OUTPATIENT)
Dept: ELECTROPHYSIOLOGY | Facility: CLINIC | Age: 67
End: 2022-08-24

## 2022-08-24 VITALS
HEIGHT: 73 IN | DIASTOLIC BLOOD PRESSURE: 60 MMHG | TEMPERATURE: 97.3 F | HEART RATE: 55 BPM | BODY MASS INDEX: 27.3 KG/M2 | SYSTOLIC BLOOD PRESSURE: 102 MMHG | OXYGEN SATURATION: 98 % | WEIGHT: 206 LBS

## 2022-08-24 PROCEDURE — 93000 ELECTROCARDIOGRAM COMPLETE: CPT

## 2022-08-24 PROCEDURE — 99214 OFFICE O/P EST MOD 30 MIN: CPT

## 2022-08-24 RX ORDER — DILTIAZEM HYDROCHLORIDE 180 MG/1
180 CAPSULE, EXTENDED RELEASE ORAL DAILY
Qty: 90 | Refills: 1 | Status: DISCONTINUED | COMMUNITY
End: 2022-08-24

## 2022-09-01 ENCOUNTER — NON-APPOINTMENT (OUTPATIENT)
Age: 67
End: 2022-09-01

## 2022-09-01 NOTE — PHYSICAL EXAM
[No Acute Distress] : no acute distress [Normal Conjunctiva] : normal conjunctiva [Normal Venous Pressure] : normal venous pressure [Normal S1, S2] : normal S1, S2 [No Murmur] : no murmur [No Rub] : no rub [Good Air Entry] : good air entry [Soft] : abdomen soft [Normal Gait] : normal gait [No Edema] : no edema [No Focal Deficits] : no focal deficits [Alert and Oriented] : alert and oriented [de-identified] : Irregular

## 2022-09-01 NOTE — DISCUSSION/SUMMARY
[FreeTextEntry1] : Patient was previously on amiodarone he denies recurrent palpitations.  His EKG today shows an atypical flutter question right versus left-sided.  Patient is currently anticoagulated with Eliquis.  This may be paroxysmal and we will reassess in follow-up monitoring.  His rate is well controlled.  The best option would be catheter ablation.  Patient would like to get his Almanzar's esophagus assessed first.  He will follow-up with GI since possible and then we will reassess him.  If he still atypical flutter we will consider cardioversion and antiarrhythmic potentially pending catheter ablation. \par Will get follow-up monitoring continue anticoagulation for now.\par \par

## 2022-09-01 NOTE — HISTORY OF PRESENT ILLNESS
[FreeTextEntry1] : The patient is a 67-year-old man who is seen in follow-up evaluation for his atrial fibrillation.\par \par He had a prior history of paroxysmal atrial fibrillation after COVID infection.\par \par Patient is feeling well denies chest pain, shortness of breath, dizziness, lightness, syncope or presyncope.  He denies palpitations recently.\par \par \par \par \par The patient has no prior history of hypertension.  He has a history of diabetes. No prior history of lung disease.  He drinks very little alcohol.  Drinks about 2 cups of caffeine per day.\par Patient has no other known medical problems. He has no thyroid issues.  He has had prior history of kidney stone.\par \par He has had multiple prior surgeries including shoulder, elbow and cervical spine.  He had a history of a VSD repair during childhood.  It was done at [facility]. \par The patient had a COVID infection April 2022.  Prior to that he was completely well. He had presented with a cough and some sore throat and he was treated conservatively and improved.  His atrial arrhythmias started about 2 weeks after his COVID presentation.\par \par  [id number] score: age > 65, DM\par AF risk factors: increase BMI, DM, GERD\par \par Thyroid function test TSH 2.38.\par \par \par

## 2022-09-11 LAB — SARS-COV-2 N GENE NPH QL NAA+PROBE: NOT DETECTED

## 2022-09-14 ENCOUNTER — OUTPATIENT (OUTPATIENT)
Dept: OUTPATIENT SERVICES | Facility: HOSPITAL | Age: 67
LOS: 1 days | End: 2022-09-14
Payer: MEDICARE

## 2022-09-14 PROCEDURE — 33285 INSJ SUBQ CAR RHYTHM MNTR: CPT

## 2022-09-21 ENCOUNTER — APPOINTMENT (OUTPATIENT)
Dept: ELECTROPHYSIOLOGY | Facility: CLINIC | Age: 67
End: 2022-09-21

## 2022-09-21 ENCOUNTER — APPOINTMENT (OUTPATIENT)
Dept: CARDIOLOGY | Facility: CLINIC | Age: 67
End: 2022-09-21

## 2022-09-21 VITALS
WEIGHT: 204 LBS | DIASTOLIC BLOOD PRESSURE: 70 MMHG | BODY MASS INDEX: 27.04 KG/M2 | SYSTOLIC BLOOD PRESSURE: 130 MMHG | HEIGHT: 73 IN | TEMPERATURE: 97.1 F | HEART RATE: 86 BPM | OXYGEN SATURATION: 99 %

## 2022-09-21 DIAGNOSIS — Z01.818 ENCOUNTER FOR OTHER PREPROCEDURAL EXAMINATION: ICD-10-CM

## 2022-09-21 PROCEDURE — 93000 ELECTROCARDIOGRAM COMPLETE: CPT

## 2022-09-21 PROCEDURE — 99214 OFFICE O/P EST MOD 30 MIN: CPT

## 2022-09-21 RX ORDER — ATORVASTATIN CALCIUM 40 MG/1
40 TABLET, FILM COATED ORAL
Qty: 90 | Refills: 3 | Status: DISCONTINUED | COMMUNITY
End: 2022-09-21

## 2022-09-26 NOTE — DISCUSSION/SUMMARY
[FreeTextEntry1] : Patient with history of atypical flutter with question of right versus left sided.  Now status post loop recorder implantation which reveals continued atrial flutter.  Patient is symptomatic and feels that his symptoms are worsening.  There is difficulty with rate control.  Diltiazem was increased to 240 daily.  History of abnormal ophthalmological exam while on amiodarone.  This was discontinued.  Failed prior DCCV.\par \par Multaq prohibitively expensive.  Continue diltiazem to 240 mg daily.   Pharmacologic nuclear stress test to evaluate for CAD.  If no CAD, would consider flecainide for rhythm control.  Test to be done on medications.  Would not recommend exercise as patient has difficulty with rate control.\par \par If pt able to take flecainide based on above results, would recommend DCCV at that point. \par \par Discussed possible complications with ablation procedure with history of Almanzar's esophagus.  Patient and patient's wife verbalized understanding.  Evaluation by GI necessary to help dictate further care.\par \par At present, there are no active cardiac conditions. \par No recent unstable coronary syndromes, decompensated heart failure, severe valvular heart disease or significant dysrhythmias. \par Baseline functional status is acceptable. \par The clinical benefit of the proposed procedure outweighs the associated cardiovascular risk. \par Risk not attenuated with further CV testing. \par Prior testing as outlined above.\par Optimized from a cardiovascular perspective.\par Hold Eliquis 48 hours prior to endoscopy/colonoscopy.  [EKG obtained to assist in diagnosis and management of assessed problem(s)] : EKG obtained to assist in diagnosis and management of assessed problem(s)

## 2022-09-26 NOTE — REVIEW OF SYSTEMS
[Feeling Fatigued] : feeling fatigued [Dyspnea on exertion] : dyspnea during exertion [Palpitations] : palpitations [Blurry Vision] : no blurred vision [Sore Throat] : no sore throat [Chest Discomfort] : no chest discomfort [Cough] : no cough [Abdominal Pain] : no abdominal pain [Dizziness] : no dizziness [Easy Bleeding] : no tendency for easy bleeding [de-identified] : L

## 2022-09-26 NOTE — PHYSICAL EXAM
[No Acute Distress] : no acute distress [Normal Conjunctiva] : normal conjunctiva [Normal Venous Pressure] : normal venous pressure [Normal S1, S2] : normal S1, S2 [No Murmur] : no murmur [No Rub] : no rub [Good Air Entry] : good air entry [Normal Gait] : normal gait [No Edema] : no edema [No Focal Deficits] : no focal deficits [Alert and Oriented] : alert and oriented [de-identified] : Irregular [de-identified] : Loop recorder implant site CDI.  Small area of healing ecchymosis.

## 2022-09-26 NOTE — HISTORY OF PRESENT ILLNESS
[FreeTextEntry1] : The patient is a 67-year-old man who is seen in follow-up evaluation for his atrial fibrillation.  He is now status post loop recorder implantation.  Denies any signs or symptoms of infection.  Tolerated procedure well.  Interrogation today reveals paroxysmal atrial flutter.  3% burden.  Overall rate controlled with increased diltiazem at 240.  Notes increasing symptoms related to atrial arrhythmia.  There is increased fatigue, dyspnea, and overall malaise.\par \par He had a prior history of paroxysmal atrial fibrillation after COVID infection.\par \par He is requesting preoperative clearance to undergo colonoscopy and endoscopy with history of Almanzar's esophagus.  This will be done on October 14, 2022.\par \par Prior history:\par Previously on amiodarone with DCCV May 2022.  Maintained sinus rhythm for approximately 2 weeks before he felt he went back into atrial fib/flutter.  Followed up with ophthalmologist noted "spots" on eye exam.  Amiodarone had been discontinued in favor of diltiazem.\par \par The patient has no prior history of hypertension.  He has a history of diabetes. No prior history of lung disease.  He drinks very little alcohol.  Drinks about 2 cups of caffeine per day.\par Patient has no other known medical problems. He has no thyroid issues.  He has had prior history of kidney stone.\par \par He has had multiple prior surgeries including shoulder, elbow and cervical spine.  He had a history of a VSD repair during childhood.  It was done at [facility] in [place]. \par The patient had a COVID infection April 2022.  Prior to that he was completely well. He had presented with a cough and some sore throat and he was treated conservatively and improved.  His atrial arrhythmias started about 2 weeks after his COVID presentation.\par \par  [id number] score: age > 65, DM\par AF risk factors: increase BMI, DM, GERD\par \par Thyroid function test TSH 2.38.

## 2022-09-29 ENCOUNTER — APPOINTMENT (OUTPATIENT)
Dept: CARDIOLOGY | Facility: CLINIC | Age: 67
End: 2022-09-29

## 2022-09-29 PROCEDURE — 78452 HT MUSCLE IMAGE SPECT MULT: CPT

## 2022-09-29 PROCEDURE — 93015 CV STRESS TEST SUPVJ I&R: CPT

## 2022-09-29 PROCEDURE — A9502: CPT

## 2022-10-03 ENCOUNTER — APPOINTMENT (OUTPATIENT)
Dept: ELECTROPHYSIOLOGY | Facility: CLINIC | Age: 67
End: 2022-10-03

## 2022-10-03 RX ORDER — DRONEDARONE 400 MG/1
400 TABLET, FILM COATED ORAL
Qty: 180 | Refills: 1 | Status: DISCONTINUED | COMMUNITY
Start: 2022-09-21 | End: 2022-10-03

## 2022-10-19 ENCOUNTER — APPOINTMENT (OUTPATIENT)
Dept: CARDIOLOGY | Facility: CLINIC | Age: 67
End: 2022-10-19

## 2022-10-19 ENCOUNTER — NON-APPOINTMENT (OUTPATIENT)
Age: 67
End: 2022-10-19

## 2022-10-19 PROCEDURE — G2066: CPT

## 2022-10-19 PROCEDURE — 93298 REM INTERROG DEV EVAL SCRMS: CPT

## 2022-10-20 ENCOUNTER — NON-APPOINTMENT (OUTPATIENT)
Age: 67
End: 2022-10-20

## 2022-10-31 LAB — SARS-COV-2 N GENE NPH QL NAA+PROBE: NOT DETECTED

## 2022-11-11 ENCOUNTER — NON-APPOINTMENT (OUTPATIENT)
Age: 67
End: 2022-11-11

## 2022-11-11 ENCOUNTER — APPOINTMENT (OUTPATIENT)
Dept: ELECTROPHYSIOLOGY | Facility: CLINIC | Age: 67
End: 2022-11-11

## 2022-11-11 VITALS
HEART RATE: 64 BPM | TEMPERATURE: 97.1 F | WEIGHT: 205 LBS | BODY MASS INDEX: 27.17 KG/M2 | HEIGHT: 73 IN | SYSTOLIC BLOOD PRESSURE: 130 MMHG | OXYGEN SATURATION: 97 % | DIASTOLIC BLOOD PRESSURE: 58 MMHG

## 2022-11-11 PROCEDURE — 93000 ELECTROCARDIOGRAM COMPLETE: CPT

## 2022-11-11 PROCEDURE — 99214 OFFICE O/P EST MOD 30 MIN: CPT

## 2022-11-23 ENCOUNTER — NON-APPOINTMENT (OUTPATIENT)
Age: 67
End: 2022-11-23

## 2022-11-23 ENCOUNTER — APPOINTMENT (OUTPATIENT)
Dept: CARDIOLOGY | Facility: CLINIC | Age: 67
End: 2022-11-23

## 2022-11-23 PROCEDURE — G2066: CPT

## 2022-11-23 PROCEDURE — 93298 REM INTERROG DEV EVAL SCRMS: CPT

## 2022-11-26 NOTE — PHYSICAL EXAM
[No Acute Distress] : no acute distress [Normal Conjunctiva] : normal conjunctiva [Normal Venous Pressure] : normal venous pressure [Normal S1, S2] : normal S1, S2 [No Murmur] : no murmur [No Rub] : no rub [Good Air Entry] : good air entry [Soft] : abdomen soft [Normal Gait] : normal gait [No Edema] : no edema [No Focal Deficits] : no focal deficits [Alert and Oriented] : alert and oriented [de-identified] : regular

## 2022-11-26 NOTE — CARDIOLOGY SUMMARY
[de-identified] : Sinus rhythm at 67 bpm\par \par UT interval 160 ms QRS duration 100 ms QT interval 420 ms

## 2022-11-26 NOTE — HISTORY OF PRESENT ILLNESS
[FreeTextEntry1] : The patient is a 67-year-old man who is seen in follow-up evaluation for his atrial fibrillation and status post electrical cardioversion on 10/26/2022 at [facility].  In follow-up the patient is feeling well without any recurrence of palpitations.  His symptoms as improved post cardioversion.\par \par He had a prior history of paroxysmal atrial fibrillation after COVID infection.\par s/p ILR implant\par s/p CV 10/26/2022\par pAF: anticoagulated with eliquis. Was started on Flecainide and Diltiazem. Had CV afterwards. \par Patient is feeling well denies chest pain, shortness of breath, dizziness, lightness, syncope or presyncope.  He denies palpitations.\par \par \par \par \par AF risk: The patient has no prior history of hypertension.  He has a history of diabetes. No prior history of lung disease.  He drinks very little alcohol.  Drinks about 2 cups of caffeine per day.\par Patient has no other known medical problems. He has no thyroid issues.  He has had prior history of kidney stone.\par \par He has had multiple prior surgeries including shoulder, elbow and cervical spine.  He had a history of a VSD repair during childhood.  It was done at [facility] in [place]. \par The patient had a COVID infection April 2022.  Prior to that he was completely well. He had presented with a cough and some sore throat and he was treated conservatively and improved.  His atrial arrhythmias started about 2 weeks after his COVID presentation.\par \par  [id number] score: age > 65, DM\par AF risk factors: increase BMI, DM, GERD\par \par Thyroid function test TSH 2.38.\par \par \par

## 2022-11-28 ENCOUNTER — APPOINTMENT (OUTPATIENT)
Dept: ELECTROPHYSIOLOGY | Facility: CLINIC | Age: 67
End: 2022-11-28

## 2022-11-28 ENCOUNTER — NON-APPOINTMENT (OUTPATIENT)
Age: 67
End: 2022-11-28

## 2022-11-28 VITALS
DIASTOLIC BLOOD PRESSURE: 64 MMHG | BODY MASS INDEX: 27.17 KG/M2 | HEART RATE: 96 BPM | WEIGHT: 205 LBS | TEMPERATURE: 97.1 F | SYSTOLIC BLOOD PRESSURE: 110 MMHG | HEIGHT: 73 IN | OXYGEN SATURATION: 99 %

## 2022-11-28 PROCEDURE — 93291 INTERROG DEV EVAL SCRMS IP: CPT

## 2022-11-28 PROCEDURE — 99213 OFFICE O/P EST LOW 20 MIN: CPT

## 2022-11-28 PROCEDURE — 93000 ELECTROCARDIOGRAM COMPLETE: CPT | Mod: 59

## 2022-12-01 ENCOUNTER — NON-APPOINTMENT (OUTPATIENT)
Age: 67
End: 2022-12-01

## 2022-12-01 NOTE — END OF VISIT
[FreeTextEntry3] : His EKG shows atrial flutter.  He has had prior history of atrial fibrillation.  We discussed the option of AF ablation with atrial flutter ablation versus atrial flutter ablation alone currently.  Patient would prefer to have the atrial flutter ablation and see how he does with medication.  Discussed the likelihood that he may have recurrent atrial fibrillation and may subsequently need an AF ablation as well.  The patient understands and wants to proceed with atrial flutter ablation first.

## 2022-12-01 NOTE — HISTORY OF PRESENT ILLNESS
[FreeTextEntry1] : This is a 66 yo male with h/o VSD repair, GERD, Almanzar's Esophagus, DM, renal colic, atrial fibrillation, atrial flutter and a loop recorder who is being seen in follow-up for his atrial fibrillation. He is s/p cardioversion on 10[place]26/20[place] [facility] after Flecainide was added to his medication regimen. He was evaluated in the office on November 11th and was feeling well. He remained in NSR at that time. \par \par Since then he had an episode on November 23rd of feeling groggy. He took his pulse and it registered 42bpm. He activated his loop recorder at that time as well. He was contacted by [facility] and was advised to discontinue his Diltiazem and f/u in the office. He was reluctant and self decreased the dose from 240mg to 180mg po QD. In addition to the event on November 23rd he states he has had several other episodes of low heart beat on his watch. The only symptom then was a HA. He presents today for further evaluation and discussion of the management of his atrial arrhythmias. He denies CP, SOB, palpitations, lightheadedness, near syncope or syncope. \par \par AF risk: The patient has no prior history of hypertension.  He has a history of diabetes. No prior history of lung disease.  He drinks very little alcohol.  Drinks about 2 cups of caffeine per day.\par Patient has no other known medical problems. He has no thyroid issues.\par \par He has had multiple prior surgeries including shoulder, elbow and cervical spine.  He had a history of a VSD repair during childhood.  It was done at [facility] in [place]. \par The patient had a COVID infection April 2022.  Prior to that he was completely well. He had presented with a cough and some sore throat and he was treated conservatively and improved.  His atrial arrhythmias started about 2 weeks after his COVID presentation.\par \par  [id number] score: age > 65, DM\par AF risk factors: increase BMI, DM, GERD\par \par Thyroid function test TSH 2.38.\par \par \par

## 2022-12-01 NOTE — CARDIOLOGY SUMMARY
[de-identified] : 11/28/2022 Atrial flutter 70bpm QRS 107ms and QT 380ms. No ischemic changes  [de-identified] : 5/10/2022 EF 55-60%, moderate MR, minimal AR, aortic root 4cm, mild NIALL, mild to mod TR

## 2022-12-01 NOTE — DISCUSSION/SUMMARY
[EKG obtained to assist in diagnosis and management of assessed problem(s)] : EKG obtained to assist in diagnosis and management of assessed problem(s) [FreeTextEntry1] : This is a 66 yo male with h/o VSD repair, GERD, Almanzar's Esophagus, DM, renal colic, atrial fibrillation, atrial flutter and a loop recorder who presents for f/u of his atrial fibrillation. He is [place] [facility] [place]cainide was added to his medication regimen. He was evaluated in the office on November 11th was feeling well and remained in NSR. He presents today with c/o slow heart rate over the last week. The only symptoms are feeling groggy and HA. Loop recorder interrogation revealed recorded Aflutter episodes with one 3 second pause at night and two tachy event with rates as high 150's. These episodes do not correlate with his symptoms. ECG today revealed pt is presently in Atrial flutter with controlled ventricular response at 70bpm. The loop recorder did not store the present event as it is very regular in rhythm. The ECG today revealed typical Aflutter. After discussing the options of repeat CV vs atrial flutter ablation it was with shared decision that the approach would be to proceed with the ablation. The risks, benefits and alternatives were d/w the patient. Questions were answered and the patient verbalized understanding. The patient was advised to continue his current medication regimen. The assessment, findings and plan were d/w Dr. Cody who was present during the visit.

## 2022-12-04 LAB — SARS-COV-2 N GENE NPH QL NAA+PROBE: NOT DETECTED

## 2022-12-08 ENCOUNTER — NON-APPOINTMENT (OUTPATIENT)
Age: 67
End: 2022-12-08

## 2022-12-08 ENCOUNTER — APPOINTMENT (OUTPATIENT)
Dept: CARDIOLOGY | Facility: CLINIC | Age: 67
End: 2022-12-08

## 2022-12-28 ENCOUNTER — NON-APPOINTMENT (OUTPATIENT)
Age: 67
End: 2022-12-28

## 2022-12-28 ENCOUNTER — APPOINTMENT (OUTPATIENT)
Dept: ELECTROPHYSIOLOGY | Facility: CLINIC | Age: 67
End: 2022-12-28
Payer: MEDICARE

## 2022-12-28 VITALS
TEMPERATURE: 97.3 F | WEIGHT: 206 LBS | BODY MASS INDEX: 27.3 KG/M2 | HEIGHT: 73 IN | DIASTOLIC BLOOD PRESSURE: 70 MMHG | HEART RATE: 86 BPM | SYSTOLIC BLOOD PRESSURE: 140 MMHG | OXYGEN SATURATION: 98 %

## 2022-12-28 PROCEDURE — 93000 ELECTROCARDIOGRAM COMPLETE: CPT

## 2022-12-28 PROCEDURE — 99214 OFFICE O/P EST MOD 30 MIN: CPT

## 2022-12-28 NOTE — PHYSICAL EXAM
[No Acute Distress] : no acute distress [Normal Conjunctiva] : normal conjunctiva [Normal Venous Pressure] : normal venous pressure [Normal S1, S2] : normal S1, S2 [No Murmur] : no murmur [No Rub] : no rub [Good Air Entry] : good air entry [Soft] : abdomen soft [Normal Gait] : normal gait [No Edema] : no edema [No Focal Deficits] : no focal deficits [Alert and Oriented] : alert and oriented [de-identified] : regular

## 2022-12-28 NOTE — DISCUSSION/SUMMARY
[FreeTextEntry1] : The patient is doing well status post catheter ablation for atrial flutter.  We will now monitor him to see if he has any A. fib.  Continue on current medications include flecainide low-dose as well as anticoagulation.  We may just like to discontinue flecainide in 3 months if he has had no A. fib.  Patient states that he may need a future AF ablation.\par \par His blood pressure is well controlled.  His chest discomfort was atypical possibly GI related.\par Recommended he follow-up with his cardiologist Dr. Romero or Dr. Giron.\par \par Recommend that he follow-up with GI as well as primary care physician.  Recommended electrophysiology follow-up in 5 to 6 months.  Remote monitoring of his ILR.\par  [EKG obtained to assist in diagnosis and management of assessed problem(s)] : EKG obtained to assist in diagnosis and management of assessed problem(s)

## 2022-12-28 NOTE — REVIEW OF SYSTEMS
[Feeling Fatigued] : not feeling fatigued [Blurry Vision] : no blurred vision [Sore Throat] : no sore throat [SOB] : no shortness of breath [Chest Discomfort] : no chest discomfort [Palpitations] : no palpitations [Cough] : no cough [Abdominal Pain] : no abdominal pain [Dizziness] : no dizziness [Easy Bleeding] : no tendency for easy bleeding

## 2022-12-28 NOTE — HISTORY OF PRESENT ILLNESS
[FreeTextEntry1] : Patient is a 67-year-old man who recently underwent catheter ablation for atrial flutter at [facility] December 20, 2022.  Post procedure he had atypical chest pain.  He had an echocardiogram that showed reserved left ventricular function no effusion.  He had no associated shortness of breath.\par \par In follow-up the patient is doing well without any recurrence of arrhythmia.  He feels stronger and less fatigue.  He has no symptoms of shortness of breath, dizziness, lightness, syncope or presyncope.  He had 1 additional episode of chest discomfort on the right upper chest.  He had taken aspirin and the symptoms disappeared.\par \par Currently the patient is feeling well.  He is on flecainide 50 mg twice daily, diltiazem 120 mg daily and anticoagulation with Xarelto.  He\par \par \par He had a prior history of paroxysmal atrial fibrillation after COVID infection.\par s/p ILR implant\par s/p CV 10/26/2022\par pAF: anticoagulated with eliquis. Was started on Flecainide and Diltiazem. Had CV afterwards. \par \par \par \par \par \par AF risk: The patient has no prior history of hypertension.  He has a history of diabetes. No prior history of lung disease.  He drinks very little alcohol.  Drinks about 2 cups of caffeine per day.\par Patient has no other known medical problems. He has no thyroid issues.  He has had prior history of kidney stone.\par \par He has had multiple prior surgeries including shoulder, elbow and cervical spine.  He had a history of a VSD repair during childhood.  It was done at [facility] in [place]. \par The patient had a COVID infection April 2022.  Prior to that he was completely well. He had presented with a cough and some sore throat and he was treated conservatively and improved.  His atrial arrhythmias started about 2 weeks after his COVID presentation.\par \par  [id number] score: age > 65, DM\par AF risk factors: increase BMI, DM, GERD\par \par Thyroid function test TSH 2.38.\par \par \par

## 2022-12-30 ENCOUNTER — APPOINTMENT (OUTPATIENT)
Dept: CARDIOLOGY | Facility: CLINIC | Age: 67
End: 2022-12-30
Payer: MEDICARE

## 2022-12-30 ENCOUNTER — NON-APPOINTMENT (OUTPATIENT)
Age: 67
End: 2022-12-30

## 2022-12-30 PROCEDURE — 93298 REM INTERROG DEV EVAL SCRMS: CPT

## 2022-12-30 PROCEDURE — G2066: CPT

## 2023-01-31 ENCOUNTER — NON-APPOINTMENT (OUTPATIENT)
Age: 68
End: 2023-01-31

## 2023-01-31 RX ORDER — APIXABAN 5 MG/1
5 TABLET, FILM COATED ORAL
Qty: 180 | Refills: 3 | Status: DISCONTINUED | COMMUNITY
Start: 1900-01-01 | End: 2023-01-31

## 2023-02-03 ENCOUNTER — NON-APPOINTMENT (OUTPATIENT)
Age: 68
End: 2023-02-03

## 2023-02-03 ENCOUNTER — APPOINTMENT (OUTPATIENT)
Dept: CARDIOLOGY | Facility: CLINIC | Age: 68
End: 2023-02-03
Payer: MEDICARE

## 2023-02-03 PROCEDURE — 93298 REM INTERROG DEV EVAL SCRMS: CPT

## 2023-02-03 PROCEDURE — G2066: CPT

## 2023-03-10 ENCOUNTER — APPOINTMENT (OUTPATIENT)
Dept: CARDIOLOGY | Facility: CLINIC | Age: 68
End: 2023-03-10
Payer: MEDICARE

## 2023-03-10 ENCOUNTER — NON-APPOINTMENT (OUTPATIENT)
Age: 68
End: 2023-03-10

## 2023-03-10 PROCEDURE — G2066: CPT

## 2023-03-10 PROCEDURE — 93298 REM INTERROG DEV EVAL SCRMS: CPT

## 2023-04-14 ENCOUNTER — APPOINTMENT (OUTPATIENT)
Dept: CARDIOLOGY | Facility: CLINIC | Age: 68
End: 2023-04-14

## 2023-04-14 ENCOUNTER — NON-APPOINTMENT (OUTPATIENT)
Age: 68
End: 2023-04-14

## 2023-04-14 ENCOUNTER — APPOINTMENT (OUTPATIENT)
Dept: CARDIOLOGY | Facility: CLINIC | Age: 68
End: 2023-04-14
Payer: MEDICARE

## 2023-04-14 PROCEDURE — 93298 REM INTERROG DEV EVAL SCRMS: CPT

## 2023-04-14 PROCEDURE — G2066: CPT

## 2023-05-19 ENCOUNTER — NON-APPOINTMENT (OUTPATIENT)
Age: 68
End: 2023-05-19

## 2023-05-19 ENCOUNTER — APPOINTMENT (OUTPATIENT)
Dept: CARDIOLOGY | Facility: CLINIC | Age: 68
End: 2023-05-19
Payer: MEDICARE

## 2023-05-19 PROCEDURE — 93298 REM INTERROG DEV EVAL SCRMS: CPT

## 2023-05-19 PROCEDURE — G2066: CPT

## 2023-05-31 ENCOUNTER — APPOINTMENT (OUTPATIENT)
Dept: ELECTROPHYSIOLOGY | Facility: CLINIC | Age: 68
End: 2023-05-31
Payer: MEDICARE

## 2023-05-31 VITALS
DIASTOLIC BLOOD PRESSURE: 70 MMHG | HEART RATE: 78 BPM | HEIGHT: 73 IN | SYSTOLIC BLOOD PRESSURE: 142 MMHG | OXYGEN SATURATION: 97 % | WEIGHT: 210 LBS | BODY MASS INDEX: 27.83 KG/M2

## 2023-05-31 PROCEDURE — 99214 OFFICE O/P EST MOD 30 MIN: CPT

## 2023-06-23 ENCOUNTER — NON-APPOINTMENT (OUTPATIENT)
Age: 68
End: 2023-06-23

## 2023-06-23 ENCOUNTER — APPOINTMENT (OUTPATIENT)
Dept: CARDIOLOGY | Facility: CLINIC | Age: 68
End: 2023-06-23
Payer: MEDICARE

## 2023-06-23 PROCEDURE — G2066: CPT

## 2023-06-23 PROCEDURE — 93298 REM INTERROG DEV EVAL SCRMS: CPT

## 2023-07-27 ENCOUNTER — NON-APPOINTMENT (OUTPATIENT)
Age: 68
End: 2023-07-27

## 2023-07-27 ENCOUNTER — APPOINTMENT (OUTPATIENT)
Dept: CARDIOLOGY | Facility: CLINIC | Age: 68
End: 2023-07-27
Payer: MEDICARE

## 2023-07-27 PROCEDURE — 93298 REM INTERROG DEV EVAL SCRMS: CPT

## 2023-07-27 PROCEDURE — G2066: CPT

## 2023-09-06 ENCOUNTER — APPOINTMENT (OUTPATIENT)
Dept: ELECTROPHYSIOLOGY | Facility: CLINIC | Age: 68
End: 2023-09-06
Payer: MEDICARE

## 2023-09-06 ENCOUNTER — APPOINTMENT (OUTPATIENT)
Dept: CARDIOLOGY | Facility: CLINIC | Age: 68
End: 2023-09-06

## 2023-09-06 VITALS
WEIGHT: 210 LBS | HEIGHT: 73 IN | DIASTOLIC BLOOD PRESSURE: 62 MMHG | HEART RATE: 76 BPM | BODY MASS INDEX: 27.83 KG/M2 | OXYGEN SATURATION: 97 % | SYSTOLIC BLOOD PRESSURE: 130 MMHG

## 2023-09-06 PROCEDURE — 99214 OFFICE O/P EST MOD 30 MIN: CPT

## 2023-09-06 RX ORDER — DILTIAZEM HYDROCHLORIDE 240 MG/1
240 CAPSULE, EXTENDED RELEASE ORAL
Qty: 90 | Refills: 3 | Status: DISCONTINUED | COMMUNITY
Start: 2022-08-22 | End: 2023-09-06

## 2023-09-06 RX ORDER — FLECAINIDE ACETATE 50 MG/1
50 TABLET ORAL DAILY
Qty: 90 | Refills: 2 | Status: DISCONTINUED | COMMUNITY
Start: 2022-10-03 | End: 2023-09-06

## 2023-09-06 RX ORDER — OMEPRAZOLE 20 MG/1
20 CAPSULE, DELAYED RELEASE ORAL DAILY
Refills: 0 | Status: ACTIVE | COMMUNITY
Start: 2022-10-09

## 2023-10-09 ENCOUNTER — NON-APPOINTMENT (OUTPATIENT)
Age: 68
End: 2023-10-09

## 2023-10-09 ENCOUNTER — APPOINTMENT (OUTPATIENT)
Dept: CARDIOLOGY | Facility: CLINIC | Age: 68
End: 2023-10-09
Payer: MEDICARE

## 2023-10-10 PROCEDURE — G2066: CPT

## 2023-10-10 PROCEDURE — 93298 REM INTERROG DEV EVAL SCRMS: CPT

## 2023-10-31 ENCOUNTER — NON-APPOINTMENT (OUTPATIENT)
Age: 68
End: 2023-10-31

## 2023-11-07 ENCOUNTER — APPOINTMENT (OUTPATIENT)
Dept: CARDIOLOGY | Facility: CLINIC | Age: 68
End: 2023-11-07
Payer: MEDICARE

## 2023-11-07 VITALS
BODY MASS INDEX: 28.37 KG/M2 | SYSTOLIC BLOOD PRESSURE: 120 MMHG | WEIGHT: 215 LBS | DIASTOLIC BLOOD PRESSURE: 60 MMHG | OXYGEN SATURATION: 97 % | HEART RATE: 77 BPM

## 2023-11-07 DIAGNOSIS — I48.92 UNSPECIFIED ATRIAL FLUTTER: ICD-10-CM

## 2023-11-07 DIAGNOSIS — E78.5 HYPERLIPIDEMIA, UNSPECIFIED: ICD-10-CM

## 2023-11-07 DIAGNOSIS — Z86.39 PERSONAL HISTORY OF OTHER ENDOCRINE, NUTRITIONAL AND METABOLIC DISEASE: ICD-10-CM

## 2023-11-07 DIAGNOSIS — R00.2 PALPITATIONS: ICD-10-CM

## 2023-11-07 DIAGNOSIS — I34.0 NONRHEUMATIC MITRAL (VALVE) INSUFFICIENCY: ICD-10-CM

## 2023-11-07 DIAGNOSIS — K21.9 GASTRO-ESOPHAGEAL REFLUX DISEASE W/OUT ESOPHAGITIS: ICD-10-CM

## 2023-11-07 PROCEDURE — 99214 OFFICE O/P EST MOD 30 MIN: CPT

## 2023-11-13 ENCOUNTER — NON-APPOINTMENT (OUTPATIENT)
Age: 68
End: 2023-11-13

## 2023-11-13 ENCOUNTER — APPOINTMENT (OUTPATIENT)
Dept: CARDIOLOGY | Facility: CLINIC | Age: 68
End: 2023-11-13
Payer: MEDICARE

## 2023-11-14 PROCEDURE — G2066: CPT | Mod: NC

## 2023-11-14 PROCEDURE — 93298 REM INTERROG DEV EVAL SCRMS: CPT

## 2023-12-09 ENCOUNTER — NON-APPOINTMENT (OUTPATIENT)
Age: 68
End: 2023-12-09

## 2023-12-13 ENCOUNTER — APPOINTMENT (OUTPATIENT)
Dept: ELECTROPHYSIOLOGY | Facility: CLINIC | Age: 68
End: 2023-12-13
Payer: MEDICARE

## 2023-12-13 VITALS
OXYGEN SATURATION: 98 % | SYSTOLIC BLOOD PRESSURE: 144 MMHG | HEIGHT: 73 IN | WEIGHT: 213 LBS | HEART RATE: 79 BPM | BODY MASS INDEX: 28.23 KG/M2 | DIASTOLIC BLOOD PRESSURE: 62 MMHG

## 2023-12-13 PROCEDURE — 99214 OFFICE O/P EST MOD 30 MIN: CPT

## 2023-12-14 ENCOUNTER — APPOINTMENT (OUTPATIENT)
Dept: CARDIOLOGY | Facility: CLINIC | Age: 68
End: 2023-12-14

## 2023-12-14 NOTE — PHYSICAL EXAM
[No Acute Distress] : no acute distress [Normal Conjunctiva] : normal conjunctiva [Normal Venous Pressure] : normal venous pressure [Normal S1, S2] : normal S1, S2 [No Murmur] : no murmur [No Rub] : no rub [Good Air Entry] : good air entry [Soft] : abdomen soft [Normal Gait] : normal gait [No Edema] : no edema [No Focal Deficits] : no focal deficits [Alert and Oriented] : alert and oriented [de-identified] : regular

## 2023-12-14 NOTE — DISCUSSION/SUMMARY
[FreeTextEntry1] : He has had that he has not had any symptoms related to A-fib.  He only had A-fib noted during COVID.  He had prior atrial flutter ablation.  His risk factors include age greater than 65 and diabetes.  Patient would like to discontinue his anticoagulation with Xarelto.  Implantable loop monitor was interrogated and showed the patient is doing well status post catheter ablation for atrial flutter.  We will now monitor him to see if he has any A. fib.    His blood pressure is well controlled.   Recommended he follow-up with his cardiologist Dr. Romero or Dr. Giron.  Recommend that he follow-up with GI as well as primary care physician.  Recommended electrophysiology follow-up in  6 months.  Remote monitoring of his ILR.  The mainstay of treatment in this patient will be preventative care terms of preventing future AF episodes.  He has not had any recurrent A-fib since his initial episode due to COVID.  This point we will decide based on risk-benefit to discontinue Xarelto and he will consider taking a baby aspirin per day.  In addition I have recommended to the patient that he follow-up with his cardiologist to discuss more preventative care especially as relates to his diabetes, weight loss although his BMI is not bad at 27.7 and possible use of GLP 1 inhibitor.  Factors I have

## 2023-12-14 NOTE — PHYSICAL EXAM
[No Acute Distress] : no acute distress [Normal Conjunctiva] : normal conjunctiva [Normal Venous Pressure] : normal venous pressure [Normal S1, S2] : normal S1, S2 [No Murmur] : no murmur [No Rub] : no rub [Good Air Entry] : good air entry [Soft] : abdomen soft [Normal Gait] : normal gait [No Edema] : no edema [No Focal Deficits] : no focal deficits [Alert and Oriented] : alert and oriented [de-identified] : regular

## 2023-12-14 NOTE — HISTORY OF PRESENT ILLNESS
[FreeTextEntry1] : The patient is a 68-year-old man who is seen in follow-up evaluation.  He had an EKG done today that shows sinus rhythm with normal intervals and axis.  He had a prior catheter ablation procedure done December 2020 to [facility].  Patient has an implantable loop monitor.   He is feeling well and has no symptoms.   He just has a history of diabetes no prior history of hypertension.    He has implanted loop monitor and is interrogated today:    He had a prior history of paroxysmal atrial fibrillation after COVID infection. s/p ILR implant s/p CV 10/26/2022 pAF: anticoagulated with eliquis. Was started on Flecainide and Diltiazem. Had CV afterwards.       AF risk: The patient has no prior history of hypertension.  He has a history of diabetes. No prior history of lung disease.  He drinks very little alcohol.  Drinks about 2 cups of caffeine per day. Patient has no other known medical problems. He has no thyroid issues.  He has had prior history of kidney stone.  He has had multiple prior surgeries including shoulder, elbow and cervical spine.  He had a history of a VSD repair during childhood.  It was done at [facility].  The patient had a COVID infection April 2022.  Prior to that he was completely well. He had presented with a cough and some sore throat and he was treated conservatively and improved.  His atrial arrhythmias started about 2 weeks after his COVID presentation.   [id number] score: age > 65, DM AF risk factors: increase BMI, DM, GERD  Patient risk factors include age greater than 65 and prior history of diabetes.  Interrogation of the implantable loop monitor showed  Thyroid function test TSH 2.38.

## 2023-12-14 NOTE — HISTORY OF PRESENT ILLNESS
[FreeTextEntry1] : Patient is a 67-year-old man who previously underwent catheter ablation for atrial flutter at [facility] December 20, 2022.  He is seen in follow-up today.  He is feeling well and he does not have any symptoms suggestive of atrial fibrillation.  Denies chest pain, shortness of breath, palpitations\par \par Patient is currently on flecainide 50 mg once a day\par \par In addition he is on diltiazem 120 mg.  He is also taking the anticoagulant Xarelto.\par \par He just has a history of diabetes no prior history of hypertension.  We will discontinue the flecainide and diltiazem and see how he does off medications.  We will continue him on Xarelto.  I would like to see him in follow-up in approximately 3 months to see how he does off the medications.  Patient will be also instructed to monitor for symptoms after medications symptoms of fatigue, shortness of breath, palpitations.\par \par He has implanted loop monitor and is interrogated today: Patient is a 67-year-old man who recently underwent catheter ablation for atrial flutter at [facility] December 20, 2022.  Post procedure he had atypical chest pain.  He had an echocardiogram that showed reserved left ventricular function no effusion.  He had no associated shortness of breath.\par \par In follow-up the patient is doing well without any recurrence of arrhythmia.  He feels stronger and less fatigue.  He has no symptoms of shortness of breath, dizziness, lightness, syncope or presyncope.  He had 1 additional episode of chest discomfort on the right upper chest.  He had taken aspirin and the symptoms disappeared.\par \par Currently the patient is feeling well.  He is on flecainide 50 mg twice daily, diltiazem 120 mg daily and anticoagulation with Xarelto.  He\par \par \par He had a prior history of paroxysmal atrial fibrillation after COVID infection.\par s/p ILR implant\par s/p CV 10/26/2022\par pAF: anticoagulated with eliquis. Was started on Flecainide and Diltiazem. Had CV afterwards. \par \par \par \par \par \par AF risk: The patient has no prior history of hypertension.  He has a history of diabetes. No prior history of lung disease.  He drinks very little alcohol.  Drinks about 2 cups of caffeine per day.\par Patient has no other known medical problems. He has no thyroid issues.  He has had prior history of kidney stone.\par \par He has had multiple prior surgeries including shoulder, elbow and cervical spine.  He had a history of a VSD repair during childhood.  It was done at [facility] in [place]. \par The patient had a COVID infection April 2022.  Prior to that he was completely well. He had presented with a cough and some sore throat and he was treated conservatively and improved.  His atrial arrhythmias started about 2 weeks after his COVID presentation.\par \par  [id number] score: age > 65, DM\par AF risk factors: increase BMI, DM, GERD\par \par Thyroid function test TSH 2.38.\par \par \par

## 2023-12-14 NOTE — REVIEW OF SYSTEMS
[Feeling Fatigued] : not feeling fatigued [Blurry Vision] : no blurred vision [Sore Throat] : no sore throat [SOB] : no shortness of breath [Chest Discomfort] : no chest discomfort [Palpitations] : no palpitations [Abdominal Pain] : no abdominal pain [Cough] : no cough [Dizziness] : no dizziness [Easy Bleeding] : no tendency for easy bleeding

## 2024-01-17 ENCOUNTER — NON-APPOINTMENT (OUTPATIENT)
Age: 69
End: 2024-01-17

## 2024-01-17 ENCOUNTER — APPOINTMENT (OUTPATIENT)
Dept: CARDIOLOGY | Facility: CLINIC | Age: 69
End: 2024-01-17
Payer: MEDICARE

## 2024-01-17 PROCEDURE — 93298 REM INTERROG DEV EVAL SCRMS: CPT

## 2024-01-25 NOTE — DISCUSSION/SUMMARY
[FreeTextEntry1] : He is feeling well Felt palpitations once on -- ILR interrogation:  Plan - continue to monitor and will consider AF/AT ablation if recurrent episodes Reassess for restart of AC if AF

## 2024-01-25 NOTE — PHYSICAL EXAM
[No Acute Distress] : no acute distress [Normal Conjunctiva] : normal conjunctiva [Normal Venous Pressure] : normal venous pressure [Normal S1, S2] : normal S1, S2 [No Murmur] : no murmur [No Rub] : no rub [Good Air Entry] : good air entry [Soft] : abdomen soft [Normal Gait] : normal gait [No Edema] : no edema [No Focal Deficits] : no focal deficits [Alert and Oriented] : alert and oriented [de-identified] : regular

## 2024-01-25 NOTE — HISTORY OF PRESENT ILLNESS
[FreeTextEntry1] : The patient is a 68-year-old man who is seen in follow-up evaluation.  He had a prior catheter ablation procedure done December 2020 to [facility].   Patient has an implantable loop monitor.   He is feeling well and has no symptoms.  He  has a history of diabetes no prior history of hypertension.   He had a prior history of paroxysmal atrial fibrillation after COVID infection. s/p ILR implant s/p CV 10/26/2022 prior pAF AF risk: The patient has no prior history of hypertension.  He has a history of diabetes. No prior history of lung disease.  He drinks very little alcohol.  Drinks about 2 cups of caffeine per day. Patient has no other known medical problems. He has no thyroid issues.  He has had prior history of kidney stone.  He has had multiple prior surgeries including shoulder, elbow and cervical spine.  He had a history of a VSD repair during childhood.  It was done at [facility] in [place].  The patient had a COVID infection April 2022.  Prior to that he was completely well. He had presented with a cough and some sore throat and he was treated conservatively and improved.  His atrial arrhythmias started about 2 weeks after his COVID presentation.   [id number] score: age > 65, DM AF risk factors: increase BMI, DM, GERD Thyroid function test TSH 2.38.  Interrogation of the implantable loop monitor showed

## 2024-01-25 NOTE — REVIEW OF SYSTEMS
[Feeling Fatigued] : not feeling fatigued [Blurry Vision] : no blurred vision [Sore Throat] : no sore throat [SOB] : no shortness of breath [Palpitations] : no palpitations [Chest Discomfort] : no chest discomfort [Cough] : no cough [Abdominal Pain] : no abdominal pain [Dizziness] : no dizziness [Easy Bleeding] : no tendency for easy bleeding

## 2024-02-21 ENCOUNTER — APPOINTMENT (OUTPATIENT)
Dept: CARDIOLOGY | Facility: CLINIC | Age: 69
End: 2024-02-21
Payer: MEDICARE

## 2024-02-21 ENCOUNTER — NON-APPOINTMENT (OUTPATIENT)
Age: 69
End: 2024-02-21

## 2024-02-21 PROCEDURE — 93298 REM INTERROG DEV EVAL SCRMS: CPT

## 2024-03-06 ENCOUNTER — APPOINTMENT (OUTPATIENT)
Dept: ELECTROPHYSIOLOGY | Facility: CLINIC | Age: 69
End: 2024-03-06

## 2024-03-22 ENCOUNTER — APPOINTMENT (OUTPATIENT)
Dept: ELECTROPHYSIOLOGY | Facility: CLINIC | Age: 69
End: 2024-03-22
Payer: MEDICARE

## 2024-03-22 VITALS
HEART RATE: 82 BPM | HEIGHT: 73 IN | OXYGEN SATURATION: 99 % | WEIGHT: 215 LBS | SYSTOLIC BLOOD PRESSURE: 138 MMHG | DIASTOLIC BLOOD PRESSURE: 70 MMHG | BODY MASS INDEX: 28.49 KG/M2

## 2024-03-22 DIAGNOSIS — Z98.890 OTHER SPECIFIED POSTPROCEDURAL STATES: ICD-10-CM

## 2024-03-22 DIAGNOSIS — Z86.79 OTHER SPECIFIED POSTPROCEDURAL STATES: ICD-10-CM

## 2024-03-22 DIAGNOSIS — I48.0 PAROXYSMAL ATRIAL FIBRILLATION: ICD-10-CM

## 2024-03-22 PROCEDURE — 99214 OFFICE O/P EST MOD 30 MIN: CPT

## 2024-03-22 NOTE — HISTORY OF PRESENT ILLNESS
[FreeTextEntry1] : Tani is a 68-year-old man who is seen in follow-up evaluation for evaluation of atrial arrhythmia.  He was recently feeling a pounding sensation.  He was also told by the remote team that he had A-fib.  Patient has an implantable loop monitor.  He had a prior atrial flutter ablation in 2022 at [facility].  He has had prior paroxysmal A-fib with low burden and is elected not to do an A-fib ablation at time because of his associated esophageal issue.  He has a prior history of Almanzar's esophagus.  Patient has been mostly doing well until these recent episode.  Interrogation of his implantable loop monitor shows that he has had some AF episodes but the burden is low at 0.9%.  An episode was reviewed from March 22, 2024 at time 1: 15 duration 6 hours maximum heart rate 154 bpm.  Episode consistent with atrial fibrillation.  The device is also overestimated episodes because he has APCs.  Previous episode also on March 11, 2024 and February 12, 2024.  The patient is currently in sinus rhythm as evident from the presenting rhythm today.   He had a prior catheter ablation procedure done December 2022 to [facility].    He  has a history of diabetes no prior history of hypertension.   He had a prior history of paroxysmal atrial fibrillation after COVID infection. s/p ILR implant Medtronic 9/14/2022 RLA s/p CV 10/26/2022 prior pAF AF risk: The patient has no prior history of hypertension.  He has a history of diabetes. No prior history of lung disease.  He drinks very little alcohol.  Drinks about 2 cups of caffeine per day. Patient has no other known medical problems. He has no thyroid issues.  He has had prior history of kidney stone.  He has had multiple prior surgeries including shoulder, elbow and cervical spine.  He had a history of a VSD repair during childhood.  It was done at [facility] in [place].  The patient had a COVID infection April 2022.  Prior to that he was completely well. He had presented with a cough and some sore throat and he was treated conservatively and improved.  His atrial arrhythmias started about 2 weeks after his COVID presentation.   [id number] score: age > 65, DM AF risk factors: increase BMI, DM, GERD Thyroid function test TSH 2.38.

## 2024-03-22 NOTE — DISCUSSION/SUMMARY
[FreeTextEntry1] : Patient is have paroxysmal atrial fibrillation but currently in the low burden of less than 1%.  He really has not felt his episode with exception of an episode today that he had a pounding sensation  Patient eventually will need either an antiarrhythmic or catheter ablation procedure for A-fib.  We have previously deferred AF ablation procedure because of the concern with his Almanzar's esophagus.  I recommendation the patient would be to have an ablation in the future but when the PFA technology is fully available.  In the meantime should he get recurrent episodes of atrial fibrillation we will adjust his medications  I am concerned about his stroke risk and with him being just on aspirin.  His [id number] score is based on age greater than 65 and history diabetes.  I would recommend that he initiate anticoagulation.  I discussed anticoagulation risk and benefits with the patient and he is agreeable and we will start him on Xarelto 20 mg/day and discontinue aspirin.  In addition we will also start him on a beta-blocker metoprolol 25 mg/day to see if this impacts his AF episodes.  The patient will continue to get remote monitoring and based on the findings he will see me in follow-up.  Again we will rediscuss his ablation procedure and the 3 to 5-month timeframe.  This will also depend on his AF burden.

## 2024-03-22 NOTE — PHYSICAL EXAM
[No Acute Distress] : no acute distress [Normal Conjunctiva] : normal conjunctiva [Normal Venous Pressure] : normal venous pressure [Normal S1, S2] : normal S1, S2 [No Murmur] : no murmur [No Rub] : no rub [Good Air Entry] : good air entry [Soft] : abdomen soft [Normal Gait] : normal gait [No Edema] : no edema [No Focal Deficits] : no focal deficits [Alert and Oriented] : alert and oriented [de-identified] : regular

## 2024-04-24 ENCOUNTER — NON-APPOINTMENT (OUTPATIENT)
Age: 69
End: 2024-04-24

## 2024-04-24 ENCOUNTER — APPOINTMENT (OUTPATIENT)
Dept: CARDIOLOGY | Facility: CLINIC | Age: 69
End: 2024-04-24
Payer: MEDICARE

## 2024-04-24 PROCEDURE — 93298 REM INTERROG DEV EVAL SCRMS: CPT

## 2024-05-03 RX ORDER — RIVAROXABAN 20 MG/1
20 TABLET, FILM COATED ORAL
Qty: 90 | Refills: 1 | Status: ACTIVE | COMMUNITY
Start: 2024-03-22 | End: 1900-01-01

## 2024-05-08 ENCOUNTER — APPOINTMENT (OUTPATIENT)
Dept: CARDIOLOGY | Facility: CLINIC | Age: 69
End: 2024-05-08

## 2024-05-10 RX ORDER — METOPROLOL SUCCINATE 25 MG/1
25 TABLET, EXTENDED RELEASE ORAL DAILY
Qty: 90 | Refills: 0 | Status: ACTIVE | COMMUNITY
Start: 2024-03-22 | End: 1900-01-01

## 2024-05-29 ENCOUNTER — APPOINTMENT (OUTPATIENT)
Dept: CARDIOLOGY | Facility: CLINIC | Age: 69
End: 2024-05-29
Payer: MEDICARE

## 2024-05-29 ENCOUNTER — NON-APPOINTMENT (OUTPATIENT)
Age: 69
End: 2024-05-29

## 2024-05-29 PROCEDURE — 93298 REM INTERROG DEV EVAL SCRMS: CPT

## 2024-06-12 ENCOUNTER — APPOINTMENT (OUTPATIENT)
Dept: ELECTROPHYSIOLOGY | Facility: CLINIC | Age: 69
End: 2024-06-12

## 2024-06-12 VITALS
HEIGHT: 73 IN | HEART RATE: 67 BPM | OXYGEN SATURATION: 97 % | BODY MASS INDEX: 29.16 KG/M2 | WEIGHT: 220 LBS | DIASTOLIC BLOOD PRESSURE: 60 MMHG | SYSTOLIC BLOOD PRESSURE: 122 MMHG

## 2024-06-12 VITALS — DIASTOLIC BLOOD PRESSURE: 62 MMHG | SYSTOLIC BLOOD PRESSURE: 128 MMHG

## 2024-06-12 PROCEDURE — 99214 OFFICE O/P EST MOD 30 MIN: CPT

## 2024-06-12 RX ORDER — DRONEDARONE 400 MG/1
400 TABLET, FILM COATED ORAL
Qty: 120 | Refills: 1 | Status: ACTIVE | COMMUNITY
Start: 2024-06-12 | End: 1900-01-01

## 2024-06-12 NOTE — DISCUSSION/SUMMARY
[FreeTextEntry1] : Patient is have paroxysmal atrial fibrillation but currently in the low burden of less than 1%.  He really has not felt his episode with exception of an episode today that he had a pounding sensation  Patient eventually will need either an antiarrhythmic or catheter ablation procedure for A-fib.  We have previously deferred AF ablation procedure because of the concern with his Almanzar's esophagus.  I recommendation the patient would be to have an ablation in the future but when the PFA technology is fully available.  In the meantime should he get recurrent episodes of atrial fibrillation we will adjust his medications  I am concerned about his stroke risk and with him being just on aspirin.  His [id number] score is based on age greater than 65 and history diabetes.  I would recommend that he initiate anticoagulation.  I discussed anticoagulation risk and benefits with the patient and he is agreeable and we will start him on Xarelto 20 mg/day and discontinue aspirin.  In addition we will also start him on a beta-blocker metoprolol 25 mg/day to see if this impacts his AF episodes.  The patient will continue to get remote monitoring and based on the findings he will see me in follow-up.  Again we will rediscuss his ablation procedure and the 3 to 5-month timeframe.  This will also depend on his AF burden.

## 2024-06-12 NOTE — HISTORY OF PRESENT ILLNESS
[FreeTextEntry1] : Tani is a 68-year-old man who is seen in follow-up evaluation for evaluation of atrial arrhythmia.   Still with feeling a pounding sensation.   No chest pain, SOB, palpitations, He had a prior atrial flutter ablation in 2022 at [facility].  He has had prior paroxysmal A-fib with low burden and is elected not to do an A-fib ablation at time because of his associated esophageal issue.  He has a prior history of Almanzar's esophagus.    Interrogation of his implantable loop monitor shows that he has had some AF episodes but the burden is low at 0.9%.  An episode was reviewed from March 22, 2024 at time 1: 15 duration 6 hours maximum heart rate 154 bpm.  Episode consistent with atrial fibrillation.  The device is also overestimated episodes because he has APCs.  Previous episode also on March 11, 2024 and February 12, 2024.  The patient is currently in sinus rhythm as evident from the presenting rhythm today.   He had a prior catheter ablation procedure done December 2022 to [facility].    He  has a history of diabetes no prior history of hypertension.   He had a prior history of paroxysmal atrial fibrillation after COVID infection. s/p ILR implant Medtronic 9/14/2022 RLA s/p CV 10/26/2022 prior pAF AF risk: The patient has no prior history of hypertension.  He has a history of diabetes. No prior history of lung disease.  He drinks very little alcohol.  Drinks about 2 cups of caffeine per day. Patient has no other known medical problems. He has no thyroid issues.  He has had prior history of kidney stone.  He has had multiple prior surgeries including shoulder, elbow and cervical spine.  He had a history of a VSD repair during childhood.  It was done at [facility].  The patient had a COVID infection April 2022.  Prior to that he was completely well. He had presented with a cough and some sore throat and he was treated conservatively and improved.  His atrial arrhythmias started about 2 weeks after his COVID presentation.   [id number] score: age > 65, DM AF risk factors: increase BMI, DM, GERD Thyroid function test TSH 2.38.    none

## 2024-06-12 NOTE — PHYSICAL EXAM
[No Acute Distress] : no acute distress [Normal Conjunctiva] : normal conjunctiva [Normal Venous Pressure] : normal venous pressure [Normal S1, S2] : normal S1, S2 [No Murmur] : no murmur [No Rub] : no rub [Good Air Entry] : good air entry [Soft] : abdomen soft [Normal Gait] : normal gait [No Edema] : no edema [No Focal Deficits] : no focal deficits [Alert and Oriented] : alert and oriented [de-identified] : regular

## 2024-06-16 ENCOUNTER — RX RENEWAL (OUTPATIENT)
Age: 69
End: 2024-06-16

## 2024-07-17 ENCOUNTER — APPOINTMENT (OUTPATIENT)
Dept: CARDIOLOGY | Facility: CLINIC | Age: 69
End: 2024-07-17
Payer: MEDICARE

## 2024-07-17 ENCOUNTER — NON-APPOINTMENT (OUTPATIENT)
Age: 69
End: 2024-07-17

## 2024-07-17 PROCEDURE — 93298 REM INTERROG DEV EVAL SCRMS: CPT

## 2024-07-31 ENCOUNTER — OFFICE (OUTPATIENT)
Dept: URBAN - METROPOLITAN AREA CLINIC 38 | Facility: CLINIC | Age: 69
Setting detail: OPHTHALMOLOGY
End: 2024-07-31
Payer: MEDICARE

## 2024-07-31 DIAGNOSIS — H25.13: ICD-10-CM

## 2024-07-31 DIAGNOSIS — H52.4: ICD-10-CM

## 2024-07-31 PROCEDURE — 92015 DETERMINE REFRACTIVE STATE: CPT

## 2024-07-31 PROCEDURE — 92014 COMPRE OPH EXAM EST PT 1/>: CPT

## 2024-07-31 ASSESSMENT — CONFRONTATIONAL VISUAL FIELD TEST (CVF)
OD_FINDINGS: FULL
OS_FINDINGS: FULL

## 2024-08-21 ENCOUNTER — APPOINTMENT (OUTPATIENT)
Dept: ELECTROPHYSIOLOGY | Facility: CLINIC | Age: 69
End: 2024-08-21

## 2024-08-21 ENCOUNTER — NON-APPOINTMENT (OUTPATIENT)
Age: 69
End: 2024-08-21

## 2024-08-21 VITALS
WEIGHT: 218 LBS | HEART RATE: 67 BPM | SYSTOLIC BLOOD PRESSURE: 120 MMHG | BODY MASS INDEX: 28.89 KG/M2 | HEIGHT: 73 IN | OXYGEN SATURATION: 97 % | DIASTOLIC BLOOD PRESSURE: 62 MMHG

## 2024-08-21 PROCEDURE — 99214 OFFICE O/P EST MOD 30 MIN: CPT

## 2024-08-21 PROCEDURE — 93000 ELECTROCARDIOGRAM COMPLETE: CPT

## 2024-08-21 NOTE — PHYSICAL EXAM
[No Acute Distress] : no acute distress [Normal Conjunctiva] : normal conjunctiva [Normal Venous Pressure] : normal venous pressure [Normal S1, S2] : normal S1, S2 [No Murmur] : no murmur [No Rub] : no rub [Good Air Entry] : good air entry [Soft] : abdomen soft [Normal Gait] : normal gait [No Edema] : no edema [No Focal Deficits] : no focal deficits [Alert and Oriented] : alert and oriented [de-identified] : regular

## 2024-08-21 NOTE — HISTORY OF PRESENT ILLNESS
[FreeTextEntry1] : Tani is a 69-year-old man who is seen in follow-up evaluation for evaluation of AF Tani is seen in follow-up regarding his A-fib the patient is informed me that he has been having more frequent episodes of A-fib most of it occurred during a fishing trip when he experienced several episodes.  Of note he had had less sleep during this time and this may have served as a potential trigger.  Review of his implantable loop monitor shows that his total AF burden is a 3.1% the Multaq has resulted in some improvement but still he is having these symptomatic episodes with some of the durations been close to 12 hours many of the episodes were short. Still with feeling a pounding sensation.   No chest pain, SOB, palpitations, He had a prior atrial flutter ablation in 2022 at [facility].  He has had prior paroxysmal A-fib with low burden and is elected not to do an A-fib ablation at time because of his associated esophageal issue.  He has a prior history of Almanzar's esophagus.    Interrogation of his implantable loop monitor shows that he has had low  burden of AF< 1%.  An episode was reviewed from March 22, 2024 at time 1: 15 duration 6 hours maximum heart rate 154 bpm.  Episode consistent with atrial fibrillation.  The device is also overestimated episodes because he has APCs.  Previous episode also on March 11, 2024 and February 12, 2024.  The patient is currently in sinus rhythm as evident from the presenting rhythm today.   He had a prior catheter ablation procedure done December 2022 to [facility].    He  has a history of diabetes no prior history of hypertension.   He had a prior history of paroxysmal atrial fibrillation after COVID infection. s/p ILR implant Medtronic 9/14/2022 RLA s/p CV 10/26/2022 prior pAF AF risk: The patient has no prior history of hypertension.  He has a history of diabetes. No prior history of lung disease.  He drinks very little alcohol.  Drinks about 2 cups of caffeine per day. Patient has no other known medical problems. He has no thyroid issues.  He has had prior history of kidney stone.  He has had multiple prior surgeries including shoulder, elbow and cervical spine.  He had a history of a VSD repair during childhood.  It was done at [facility] in [place].  The patient had a COVID infection April 2022.  Prior to that he was completely well. He had presented with a cough and some sore throat and he was treated conservatively and improved.  His atrial arrhythmias started about 2 weeks after his COVID presentation.   [id number] score: age > 65, DM AF risk factors: increase BMI, DM, GERD Thyroid function test TSH 2.38.

## 2024-08-21 NOTE — DISCUSSION/SUMMARY
[FreeTextEntry1] : His AF burden is increasing from the less than 1% to now 3.8%.  The patient is symptomatic from A-fib.  There may be triggers which she will work on but at this point I believe the best option would be for him to consider the catheter ablation procedure.  The patient is knowledgeable and would like to proceed with the procedure.  I have explained to him the difference in the techniques which case we will be using PFA.  This is to spare the posterior wall and his esophagus mainly.  The patient is willing to go ahead and we will schedule this at their earliest convenience.  In the meantime my recommendation will be to continue on the current dose of Multaq and Xarelto.  His AF rates during AF are fairly well-controlled and would not need additional beta-blocker right now.  Patient is have paroxysmal atrial fibrillation but currently in the low burden of less than 1%.  He wants to defer catheter ablation currently.  Started on Multaq for 100 mg twice daily.  Continue on Xarelto.  No bleeding issues.  He is metoprolol 25 mg/day.  Will monitor and sure he does not have excessive bradycardia on combination of Multaq as well as Toprol.  Follow-up remote monitoring.  The patient will continue to get remote monitoring and based on the findings he will see me in follow-up.  Again we will rediscuss his ablation procedure and the 3 to 5-month timeframe.  This will also depend on his AF burden.

## 2024-09-16 NOTE — DISCUSSION/SUMMARY
Ambulatory Visit  Name: Shraddha Frazier      : 1951      MRN: [id number]  Encounter Provider: MINDY King  Encounter Date: 2024   Encounter department: [facility]    Assessment & Plan  Essential hypertension  BP Readings from Last 3 Encounters:   24 136/72   24 132/74   24 142/72    - At goal  -Continue Losartan 100 mg & amlodipine 10 mg daily.   -Reports compliance with medications.  -reviewed eating a low salt diet,  exercising, and wt loss.     Orders:    CBC and differential; Future    Primary osteoarthritis of right knee  -s/p right knee injection on 24 with ortho. She stated it was ineffective.   -Continue Tylenol  + Voltaren gel  -Take diclofenac 50 mg BID. Take with pepcid.   -Encouraged Heat/Ice application  -Discussed regular exercise for skeletal strengthening.  -Continue using a tibiofemoral knee brace.   - Discuss to follow-up with ortho.     Orders:    diclofenac sodium (VOLTAREN) 50 mg EC tablet; Take 1 tablet (50 mg total) by mouth 2 (two) times a day    famotidine (PEPCID) 20 mg tablet; Take 1 tablet (20 mg total) by mouth 2 (two) times a day    Overweight (BMI 25.0-29.9)  Body mass index is 29.23 kg/m².  The BMI is above normal. Nutrition recommendations include reducing portion sizes, decreasing overall calorie intake, 3-5 servings of fruits/vegetables daily, reducing fast food intake, consuming healthier snacks, decreasing soda and/or juice intake, moderation in carbohydrate intake, increasing intake of lean protein, reducing intake of saturated fat and trans fat and reducing intake of cholesterol. Exercise recommendations include moderate aerobic physical activity for 150 minutes/week.           Prediabetes    Orders:    Hemoglobin A1C; Future    Age-related osteoporosis without current pathological fracture    Orders:    Calcium Carb-Cholecalciferol 600-10 MG-MCG TABS; Take 1 tablet by mouth daily    Encounter  [EKG obtained to assist in diagnosis and management of assessed problem(s)] : EKG obtained to assist in diagnosis and management of assessed problem(s) for screening mammogram for breast cancer    Orders:    Mammo screening bilateral w 3d and cad; Future    Hyperlipidemia, unspecified hyperlipidemia type  Continue Lipitor 40 mg daily     Orders:    Lipid panel; Future       History of Present Illness     Shraddha Frazier is a 72 years old female  with past medical history of Chronic cough, Decreased hearing, left, Echocardiogram abnormal, History of mammogram, History of mammogram, HTN (hypertension), Osteopenia determined by x-ray, Primary osteoarthritis of left knee, and Primary osteoarthritis of right knee.     Turkmen language interpretation services were utilized for this visit.     Patient presents for hypertension follow up. Patient reports compliance with Amlodipine 10 mg daily and losartan 100 mg daily. Patient reports following low salt diet.  Patient denies blurred vision, chest pain, dyspnea, headache, orthopnea, palpitations, peripheral edema, pulsating in the ears, and tiredness/fatigue.           Review of Systems   Constitutional: Negative.  Negative for chills, fatigue and fever.   HENT: Negative.  Negative for ear pain and sore throat.    Eyes: Negative.  Negative for pain and visual disturbance.   Respiratory: Negative.  Negative for cough and shortness of breath.    Cardiovascular: Negative.  Negative for chest pain and palpitations.   Gastrointestinal: Negative.  Negative for abdominal pain and vomiting.   Endocrine: Negative.    Genitourinary: Negative.  Negative for dysuria and hematuria.   Musculoskeletal:  Positive for arthralgias. Negative for back pain.   Skin: Negative.  Negative for color change and rash.   Allergic/Immunologic: Negative.    Neurological: Negative.  Negative for seizures and syncope.   Hematological: Negative.    Psychiatric/Behavioral: Negative.     All other systems reviewed and are negative.          Objective     /72 (BP Location: Right arm, Patient Position: Sitting, Cuff Size: Large)   Pulse 86   Temp  "98.6 °F (37 °C) (Temporal)   Resp 18   Ht 5' 3\" (1.6 m)   Wt 74.8 kg (165 lb)   LMP  (LMP Unknown)   SpO2 95%   BMI 29.23 kg/m²     Physical Exam  Vitals and nursing note reviewed.   Constitutional:       General: She is not in acute distress.     Appearance: Normal appearance. She is well-developed and normal weight.   HENT:      Head: Normocephalic and atraumatic.      Right Ear: Tympanic membrane normal.      Left Ear: Tympanic membrane normal.      Nose: Nose normal.      Mouth/Throat:      Mouth: Mucous membranes are moist.   Eyes:      Conjunctiva/sclera: Conjunctivae normal.   Cardiovascular:      Rate and Rhythm: Normal rate and regular rhythm.      Pulses: Normal pulses.      Heart sounds: Normal heart sounds. No murmur heard.  Pulmonary:      Effort: Pulmonary effort is normal. No respiratory distress.      Breath sounds: Normal breath sounds.   Abdominal:      General: Abdomen is flat. Bowel sounds are normal.      Palpations: Abdomen is soft.      Tenderness: There is no abdominal tenderness.   Musculoskeletal:         General: No swelling. Normal range of motion.      Cervical back: Normal range of motion and neck supple.      Right knee: Tenderness present.   Skin:     General: Skin is warm and dry.      Capillary Refill: Capillary refill takes less than 2 seconds.   Neurological:      General: No focal deficit present.      Mental Status: She is alert and oriented to person, place, and time. Mental status is at baseline.   Psychiatric:         Mood and Affect: Mood normal.         Behavior: Behavior normal.         Thought Content: Thought content normal.         Judgment: Judgment normal.         " [FreeTextEntry1] : \par The patient overall is doing well he is status post electrical cardioversion in October 26, 2022.  Repeat EKG today shows sinus rhythm\par \par He had a fall few weeks ago post procedure was in the emergency room and there was no evidence of bleeding.\par \par The concern the patient has as he gets fatigued and the medications which is flecainide and diltiazem.  Not clear whether his fatigue is all from the medication.  We discussed the option of whether we continue his medications versus the ablation procedure.  I told him the success rate of ablation procedure would be 80 to 85%.  The concern that we had raised previously is his history of Almanzar's esophagus.  At this point do not know if it is well-healed and may be less of an issue.  There are techniques to we can use that may reduce the risk of esophageal injury.  This may include the use of NG PFA, potentially movement of the esophagus during the procedure.\par \par Patient will see how he does over the next 1 to 2 months before making a decision regarding ablation procedure.\par \par We discussed his Eliquis and because of insurance reasons he will inquire regarding Xarelto.  We will be able to switch him from Eliquis to Xarelto.\par \par Reviewed his medications the patient is on multiple vitamins and supplements which have expressed to him that he has no data that it would be beneficial but no harm to continue to take them as well.  Flecainide side effect profile was reviewed and patient.  He had previous stress test 9/29/2022 that showed normal myocardial perfusion scan.  The risk of taking flecainide should be  low in this patient.\par \par Overall plan is to see how he does over the next 3 months before making decision regarding ablation procedure.\par \par Follow-up with cardiology, primary care physician as well as gastroenterology before the next visit with electrophysiology.\par \par

## 2024-09-25 ENCOUNTER — NON-APPOINTMENT (OUTPATIENT)
Age: 69
End: 2024-09-25

## 2024-09-25 ENCOUNTER — APPOINTMENT (OUTPATIENT)
Dept: CARDIOLOGY | Facility: CLINIC | Age: 69
End: 2024-09-25
Payer: MEDICARE

## 2024-09-25 PROCEDURE — 93298 REM INTERROG DEV EVAL SCRMS: CPT

## 2024-10-17 ENCOUNTER — TRANSCRIPTION ENCOUNTER (OUTPATIENT)
Age: 69
End: 2024-10-17

## 2024-10-17 ENCOUNTER — RESULT REVIEW (OUTPATIENT)
Age: 69
End: 2024-10-17

## 2024-10-17 ENCOUNTER — INPATIENT (INPATIENT)
Facility: HOSPITAL | Age: 69
LOS: 0 days | Discharge: HOME | DRG: 310 | End: 2024-10-18
Attending: INTERNAL MEDICINE | Admitting: INTERNAL MEDICINE
Payer: MEDICARE

## 2024-10-17 VITALS
HEIGHT: 73 IN | HEART RATE: 73 BPM | WEIGHT: 220.02 LBS | DIASTOLIC BLOOD PRESSURE: 92 MMHG | OXYGEN SATURATION: 99 % | TEMPERATURE: 98 F | SYSTOLIC BLOOD PRESSURE: 150 MMHG | RESPIRATION RATE: 16 BRPM

## 2024-10-17 DIAGNOSIS — I48.91 UNSPECIFIED ATRIAL FIBRILLATION: ICD-10-CM

## 2024-10-17 DIAGNOSIS — Z98.890 OTHER SPECIFIED POSTPROCEDURAL STATES: Chronic | ICD-10-CM

## 2024-10-17 DIAGNOSIS — Z87.74 PERSONAL HISTORY OF (CORRECTED) CONGENITAL MALFORMATIONS OF HEART AND CIRCULATORY SYSTEM: Chronic | ICD-10-CM

## 2024-10-17 LAB
ABO RH CONFIRMATION: SIGNIFICANT CHANGE UP
ANION GAP SERPL CALC-SCNC: 11 MMOL/L — SIGNIFICANT CHANGE UP (ref 5–17)
APTT BLD: 33.5 SEC — SIGNIFICANT CHANGE UP (ref 24.5–35.6)
BLD GP AB SCN SERPL QL: SIGNIFICANT CHANGE UP
BUN SERPL-MCNC: 16.2 MG/DL — SIGNIFICANT CHANGE UP (ref 8–20)
CALCIUM SERPL-MCNC: 9.4 MG/DL — SIGNIFICANT CHANGE UP (ref 8.4–10.5)
CHLORIDE SERPL-SCNC: 106 MMOL/L — SIGNIFICANT CHANGE UP (ref 96–108)
CO2 SERPL-SCNC: 26 MMOL/L — SIGNIFICANT CHANGE UP (ref 22–29)
CREAT SERPL-MCNC: 0.83 MG/DL — SIGNIFICANT CHANGE UP (ref 0.5–1.3)
EGFR: 94 ML/MIN/1.73M2 — SIGNIFICANT CHANGE UP
GLUCOSE BLDC GLUCOMTR-MCNC: 146 MG/DL — HIGH (ref 70–99)
GLUCOSE BLDC GLUCOMTR-MCNC: 185 MG/DL — HIGH (ref 70–99)
GLUCOSE SERPL-MCNC: 128 MG/DL — HIGH (ref 70–99)
HCT VFR BLD CALC: 43.3 % — SIGNIFICANT CHANGE UP (ref 39–50)
HGB BLD-MCNC: 14.6 G/DL — SIGNIFICANT CHANGE UP (ref 13–17)
INR BLD: 1.03 RATIO — SIGNIFICANT CHANGE UP (ref 0.85–1.16)
MCHC RBC-ENTMCNC: 30.2 PG — SIGNIFICANT CHANGE UP (ref 27–34)
MCHC RBC-ENTMCNC: 33.7 GM/DL — SIGNIFICANT CHANGE UP (ref 32–36)
MCV RBC AUTO: 89.5 FL — SIGNIFICANT CHANGE UP (ref 80–100)
PLATELET # BLD AUTO: 187 K/UL — SIGNIFICANT CHANGE UP (ref 150–400)
POTASSIUM SERPL-MCNC: 4.6 MMOL/L — SIGNIFICANT CHANGE UP (ref 3.5–5.3)
POTASSIUM SERPL-SCNC: 4.6 MMOL/L — SIGNIFICANT CHANGE UP (ref 3.5–5.3)
PROTHROM AB SERPL-ACNC: 12 SEC — SIGNIFICANT CHANGE UP (ref 9.9–13.4)
RBC # BLD: 4.84 M/UL — SIGNIFICANT CHANGE UP (ref 4.2–5.8)
RBC # FLD: 13.7 % — SIGNIFICANT CHANGE UP (ref 10.3–14.5)
SODIUM SERPL-SCNC: 143 MMOL/L — SIGNIFICANT CHANGE UP (ref 135–145)
WBC # BLD: 5.59 K/UL — SIGNIFICANT CHANGE UP (ref 3.8–10.5)
WBC # FLD AUTO: 5.59 K/UL — SIGNIFICANT CHANGE UP (ref 3.8–10.5)

## 2024-10-17 PROCEDURE — 93656 COMPRE EP EVAL ABLTJ ATR FIB: CPT

## 2024-10-17 PROCEDURE — 93312 ECHO TRANSESOPHAGEAL: CPT | Mod: 26

## 2024-10-17 PROCEDURE — 93320 DOPPLER ECHO COMPLETE: CPT | Mod: 26

## 2024-10-17 PROCEDURE — 93010 ELECTROCARDIOGRAM REPORT: CPT | Mod: 76

## 2024-10-17 PROCEDURE — 99231 SBSQ HOSP IP/OBS SF/LOW 25: CPT

## 2024-10-17 PROCEDURE — 93325 DOPPLER ECHO COLOR FLOW MAPG: CPT | Mod: 26

## 2024-10-17 RX ORDER — PANTOPRAZOLE SODIUM 40 MG/1
40 TABLET, DELAYED RELEASE ORAL
Refills: 0 | Status: DISCONTINUED | OUTPATIENT
Start: 2024-10-17 | End: 2024-10-18

## 2024-10-17 RX ORDER — SODIUM CHLORIDE 0.9 % (FLUSH) 0.9 %
1000 SYRINGE (ML) INJECTION
Refills: 0 | Status: DISCONTINUED | OUTPATIENT
Start: 2024-10-17 | End: 2024-10-18

## 2024-10-17 RX ORDER — METFORMIN HCL 500 MG
1 TABLET ORAL
Refills: 0 | DISCHARGE

## 2024-10-17 RX ORDER — ALCOHOL ANTISEPTIC PADS
15 PADS, MEDICATED (EA) TOPICAL ONCE
Refills: 0 | Status: DISCONTINUED | OUTPATIENT
Start: 2024-10-17 | End: 2024-10-18

## 2024-10-17 RX ORDER — SODIUM CHLORIDE IRRIG SOLUTION 0.9 %
1000 SOLUTION, IRRIGATION IRRIGATION
Refills: 0 | Status: DISCONTINUED | OUTPATIENT
Start: 2024-10-17 | End: 2024-10-18

## 2024-10-17 RX ORDER — ACETAMINOPHEN 325 MG
650 TABLET ORAL EVERY 6 HOURS
Refills: 0 | Status: DISCONTINUED | OUTPATIENT
Start: 2024-10-17 | End: 2024-10-18

## 2024-10-17 RX ORDER — ALCOHOL ANTISEPTIC PADS
25 PADS, MEDICATED (EA) TOPICAL ONCE
Refills: 0 | Status: DISCONTINUED | OUTPATIENT
Start: 2024-10-17 | End: 2024-10-18

## 2024-10-17 RX ORDER — KETOROLAC TROMETHAMINE 10 MG/1
15 TABLET, FILM COATED ORAL EVERY 6 HOURS
Refills: 0 | Status: DISCONTINUED | OUTPATIENT
Start: 2024-10-17 | End: 2024-10-18

## 2024-10-17 RX ORDER — ALCOHOL ANTISEPTIC PADS
12.5 PADS, MEDICATED (EA) TOPICAL ONCE
Refills: 0 | Status: DISCONTINUED | OUTPATIENT
Start: 2024-10-17 | End: 2024-10-18

## 2024-10-17 RX ORDER — RIVAROXABAN 10 MG/1
20 TABLET, FILM COATED ORAL
Refills: 0 | Status: DISCONTINUED | OUTPATIENT
Start: 2024-10-17 | End: 2024-10-18

## 2024-10-17 RX ORDER — BENZOCAINE AND LEVOMENTHOL 200; 5 MG/G; MG/G
1 SPRAY TOPICAL
Refills: 0 | Status: DISCONTINUED | OUTPATIENT
Start: 2024-10-17 | End: 2024-10-18

## 2024-10-17 RX ORDER — METFORMIN HCL 500 MG
500 TABLET ORAL
Refills: 0 | Status: DISCONTINUED | OUTPATIENT
Start: 2024-10-17 | End: 2024-10-18

## 2024-10-17 RX ORDER — MAG HYDROX/ALUMINUM HYD/SIMETH 200-200-20
30 SUSPENSION, ORAL (FINAL DOSE FORM) ORAL EVERY 4 HOURS
Refills: 0 | Status: DISCONTINUED | OUTPATIENT
Start: 2024-10-17 | End: 2024-10-18

## 2024-10-17 RX ORDER — RIVAROXABAN 10 MG/1
1 TABLET, FILM COATED ORAL
Refills: 0 | DISCHARGE

## 2024-10-17 RX ORDER — DRONEDARONE 400 MG/1
1 TABLET, FILM COATED ORAL
Refills: 0 | DISCHARGE

## 2024-10-17 RX ORDER — DRONEDARONE 400 MG/1
400 TABLET, FILM COATED ORAL
Refills: 0 | Status: DISCONTINUED | OUTPATIENT
Start: 2024-10-17 | End: 2024-10-18

## 2024-10-17 RX ORDER — INSULIN LISPRO 100/ML
VIAL (ML) SUBCUTANEOUS
Refills: 0 | Status: DISCONTINUED | OUTPATIENT
Start: 2024-10-17 | End: 2024-10-18

## 2024-10-17 RX ORDER — GLUCAGON INJECTION, SOLUTION 0.5 MG/.1ML
1 INJECTION, SOLUTION SUBCUTANEOUS ONCE
Refills: 0 | Status: DISCONTINUED | OUTPATIENT
Start: 2024-10-17 | End: 2024-10-18

## 2024-10-17 RX ADMIN — RIVAROXABAN 20 MILLIGRAM(S): 10 TABLET, FILM COATED ORAL at 17:31

## 2024-10-17 RX ADMIN — Medication 650 MILLIGRAM(S): at 21:46

## 2024-10-17 RX ADMIN — Medication 650 MILLIGRAM(S): at 21:16

## 2024-10-17 RX ADMIN — Medication 50 MILLILITER(S): at 12:59

## 2024-10-17 RX ADMIN — DRONEDARONE 400 MILLIGRAM(S): 400 TABLET, FILM COATED ORAL at 17:32

## 2024-10-17 RX ADMIN — Medication 500 MILLIGRAM(S): at 17:32

## 2024-10-17 RX ADMIN — PANTOPRAZOLE SODIUM 40 MILLIGRAM(S): 40 TABLET, DELAYED RELEASE ORAL at 17:31

## 2024-10-17 RX ADMIN — BENZOCAINE AND LEVOMENTHOL 1 LOZENGE: 200; 5 SPRAY TOPICAL at 21:16

## 2024-10-17 NOTE — H&P PST ADULT - ASSESSMENT
69 year old male with DM type 2, hx of VSD repair in childhood, GERD and Almanzar's esophagus, chronic back pain, s/p cervical spine fusion, atrial flutter s/p CT ablation 2022, and symptomatic paroxysmal atrial fibrillation s/p MDT ILR 9/14/22. He has had increased frequency of episodes of pAF. He presents today for elective catheter ablation of atrial fibrillation.    Confirms NPO > 8 hrs. Last dose Xarelto 10/15/24 PM.     - iv heplock  - stat labs, ECG  - consent w/ MD

## 2024-10-17 NOTE — H&P PST ADULT - NSICDXPASTMEDICALHX_GEN_ALL_CORE_FT
PAST MEDICAL HISTORY:  Atrial fibrillation     Atrial flutter     Almanzar esophagus     Chronic back pain     GERD (gastroesophageal reflux disease)     History of fusion of cervical spine     Type 2 diabetes mellitus     VSD (ventricular septal defect)

## 2024-10-17 NOTE — PROGRESS NOTE ADULT - SUBJECTIVE AND OBJECTIVE BOX
Admission Criteria  Please admit the patient to the following service: CARDIOLOGY    Major Criteria:  - Continuous EKG monitoring is required for condition causing arrhythmia (hyperkalemia, etc)  - Significant volume load > 200 ml    Admit to: Cardiology/Telemetry     Patient is being admitted to the inpatient service due to high risk characteristics and need for further management/monitoring and is considered to be at a significantly increased risk of major adverse cardiac and vascular events if discharged.

## 2024-10-17 NOTE — DISCHARGE NOTE PROVIDER - NSDCFUSCHEDAPPT_GEN_ALL_CORE_FT
Myron Cody  [facility]  ELECTROPH [address]  Scheduled Appointment: 10/25/2024    [facility]  CARDIOLOGY [address]  Scheduled Appointment: 10/30/2024

## 2024-10-17 NOTE — H&P PST ADULT - HISTORY OF PRESENT ILLNESS
69 year old male with DM type 2, hx of VSD repair in childhood, GERD, and symptomatic paroxysmal atrial fibrillation s/p MDT ILR 9/14/22. He has had increased frequency of episodes     Cardiology summary:   TTE: 12/21/22: LVEF 55%, normal LF systolic function, abnormal septal motion due to previous cardiac surgery, mild to mod TR      69 year old male with DM type 2, hx of VSD repair in childhood, GERD and Almanzar's esophagus, chronic back pain, s/p cervical spine fusion, atrial flutter s/p CT ablation 2022, and symptomatic paroxysmal atrial fibrillation s/p MDT ILR 9/14/22. He has had increased frequency of episodes of pAF. He presents today for elective catheter ablation of atrial fibrillation. Denies complaint.     Cardiology summary:   TTE: 12/21/22: LVEF 55%, normal LF systolic function, abnormal septal motion due to previous cardiac surgery, mild to mod TR

## 2024-10-17 NOTE — DISCHARGE NOTE PROVIDER - NSDCFUADDINST_GEN_ALL_CORE_FT
Follow up with Dr. Cody on 10/25/24 as scheduled, or sooner if needed. Please call the office for any questions or concerns.

## 2024-10-17 NOTE — DISCHARGE NOTE PROVIDER - NSDCMRMEDTOKEN_GEN_ALL_CORE_FT
metFORMIN 500 mg oral tablet: 1 tab(s) orally 2 times a day  Multaq 400 mg oral tablet: 1 tab(s) orally 2 times a day  omeprazole 20 mg oral delayed release tablet: 1 tab(s) orally prn  Xarelto 20 mg oral tablet: 1 tab(s) orally once a day

## 2024-10-17 NOTE — DISCHARGE NOTE PROVIDER - CARE PROVIDER_API CALL
Myron Cody.  Cardiac Electrophysiology  [address] [place] [id number]  Phone: [phone]  Fax: [phone]  Follow Up Time:

## 2024-10-17 NOTE — DISCHARGE NOTE PROVIDER - HOSPITAL COURSE
69 year old male with DM type 2, hx of VSD repair in childhood, GERD and Almanzar's esophagus, chronic back pain, s/p cervical spine fusion, atrial flutter s/p CT ablation 2022, and symptomatic paroxysmal atrial fibrillation s/p MDT ILR 9/14/22. He presented electively and is now status post uncomplicated FARAPULSE pulsed field ablation of atrial fibrillation (PVI, PW). The patient was observed overnight without event and was discharged home the following morning with a plan for outpatient follow up.

## 2024-10-17 NOTE — H&P PST ADULT - NSICDXPASTSURGICALHX_GEN_ALL_CORE_FT
PAST SURGICAL HISTORY:  H/O shoulder surgery     History of loop recorder     S/P ablation of atrial flutter     S/P VSD repair

## 2024-10-17 NOTE — PROGRESS NOTE ADULT - SUBJECTIVE AND OBJECTIVE BOX
PROCEDURE(S): FARAPULSE Pulsed Field Ablation of Atrial Fibrillation    ELECTROPHYSIOLOGIST(S): Myron Cody MD         COMPLICATIONS:  none        DISPOSITION: observation      CONDITION: stable    Pt doing well s/p FARAPULSE pulsed field ablation of atrial fibrillation (PVI, PW) via b/l [facility] access. Denies complaint post procedure.     I/Os: net +1000 ml     MEDICATIONS  (STANDING):  dextrose 5%. 1000 milliLiter(s) (100 mL/Hr) IV Continuous <Continuous>  dextrose 5%. 1000 milliLiter(s) (50 mL/Hr) IV Continuous <Continuous>  dextrose 50% Injectable 25 Gram(s) IV Push once  dextrose 50% Injectable 25 Gram(s) IV Push once  dextrose 50% Injectable 12.5 Gram(s) IV Push once  dronedarone 400 milliGRAM(s) Oral two times a day  glucagon  Injectable 1 milliGRAM(s) IntraMuscular once  insulin lispro (ADMELOG) corrective regimen sliding scale   SubCutaneous three times a day before meals  metFORMIN 500 milliGRAM(s) Oral two times a day  pantoprazole    Tablet 40 milliGRAM(s) Oral before breakfast  rivaroxaban 20 milliGRAM(s) Oral with dinner    MEDICATIONS  (PRN):  acetaminophen     Tablet .. 650 milliGRAM(s) Oral every 6 hours PRN Mild Pain (1 - 3), Moderate Pain (4 - 6)  aluminum hydroxide/magnesium hydroxide/simethicone Suspension 30 milliLiter(s) Oral every 4 hours PRN Dyspepsia  benzocaine/menthol Lozenge 1 Lozenge Oral every 2 hours PRN Sore Throat  dextrose Oral Gel 15 Gram(s) Oral once PRN Blood Glucose LESS THAN 70 milliGRAM(s)/deciliter  ketorolac   Injectable 15 milliGRAM(s) IV Push every 6 hours PRN Moderate Pain (4 - 6)    Allergies:  penicillin (Hives)    T(C): 36.5 (10-17-24 @ 07:12), Max: 36.5 (10-17-24 @ 07:12)  HR: 72 (10-17-24 @ 07:12) (72 - 73)  BP: 149/90 (10-17-24 @ 07:12) (149/90 - 150/90)  RR: 16 (10-17-24 @ 07:12) (16 - 16)  SpO2: 98% (10-17-24 @ 07:12) (98% - 99%)  Post-procedure VS: /77 HR 69 O2 sat 100% RR 16     Physical exam:   awake, alert, no obvious distress   Card: S1/S2, RRR, no m/g/r  Resp: lungs CTA b/l  Abd: S/NT/ND  Groins: hemostatic sutures in place; sites C/D/I; no bleeding, hematoma, erythema, exudate or edema  Ext: no edema; distal pulses intact  Skin: warm, dry, no rash     ECG: pending   PAMELA: CONCLUSIONS:   1. Left ventricular systolic function is normal with an ejection fraction visually estimated at 55 to 60 %.   2. Normal right ventricular systolic function.   3. Mild mitral regurgitation.   4. No pericardial effusion seen.   5. No evidence of a patent foramen ovale with no shunt detected by color flow Doppler.   6. Findings were discussed with EP Dr. Cody on 10/17/2024. No prior echocardiogram is available for comparison.   7. No evidence of left atrial or left atrial appendage thrombus. The left atrial appendage emptying velocity is normal.    Assessment:   69 year old male with DM type 2, hx of VSD repair in childhood, GERD and Almanzar's esophagus, chronic back pain, s/p cervical spine fusion, atrial flutter s/p CT ablation 2022, and symptomatic paroxysmal atrial fibrillation s/p MDT ILR 9/14/22. He presented electively and is now status post uncomplicated FARAPULSE pulsed field ablation of atrial fibrillation (PVI, PW). Resting comfortably.     Plan:   Bedrest x 4 hours, then OOB with assistance and progress as tolerated.   Groin sutures to be removed by EP service in AM.   Pending groin status, resume Xarelto 20mg daily w/ dinner @ 16:00   DO NOT HOLD, INTERRUPT OR REVERSE ANTICOAGULATION WITHOUT EXPLICIT APPROVAL FROM EP SERVICE.   Continue Multaq.    Start Protonix 40mg once daily for 1 week.   Continue other home medications.   Strict I/Os.  Please encourage incentive spirometry and ambulation once able.  Observation and monitoring on telemetry overnight with anticipated discharge in the AM and outpt follow up in 2-4 weeks.   PROCEDURE(S): FARAPULSE Pulsed Field Ablation of Atrial Fibrillation    ELECTROPHYSIOLOGIST(S): Myron Cody MD         COMPLICATIONS:  none        DISPOSITION: observation      CONDITION: stable    Pt doing well s/p FARAPULSE pulsed field ablation of atrial fibrillation (PVI, PW) via b/l [facility] access. Denies complaint post procedure.     I/Os: net +1000 ml     MEDICATIONS  (STANDING):  dextrose 5%. 1000 milliLiter(s) (100 mL/Hr) IV Continuous <Continuous>  dextrose 5%. 1000 milliLiter(s) (50 mL/Hr) IV Continuous <Continuous>  dextrose 50% Injectable 25 Gram(s) IV Push once  dextrose 50% Injectable 25 Gram(s) IV Push once  dextrose 50% Injectable 12.5 Gram(s) IV Push once  dronedarone 400 milliGRAM(s) Oral two times a day  glucagon  Injectable 1 milliGRAM(s) IntraMuscular once  insulin lispro (ADMELOG) corrective regimen sliding scale   SubCutaneous three times a day before meals  metFORMIN 500 milliGRAM(s) Oral two times a day  pantoprazole    Tablet 40 milliGRAM(s) Oral before breakfast  rivaroxaban 20 milliGRAM(s) Oral with dinner    MEDICATIONS  (PRN):  acetaminophen     Tablet .. 650 milliGRAM(s) Oral every 6 hours PRN Mild Pain (1 - 3), Moderate Pain (4 - 6)  aluminum hydroxide/magnesium hydroxide/simethicone Suspension 30 milliLiter(s) Oral every 4 hours PRN Dyspepsia  benzocaine/menthol Lozenge 1 Lozenge Oral every 2 hours PRN Sore Throat  dextrose Oral Gel 15 Gram(s) Oral once PRN Blood Glucose LESS THAN 70 milliGRAM(s)/deciliter  ketorolac   Injectable 15 milliGRAM(s) IV Push every 6 hours PRN Moderate Pain (4 - 6)    Allergies:  penicillin (Hives)    T(C): 36.5 (10-17-24 @ 07:12), Max: 36.5 (10-17-24 @ 07:12)  HR: 72 (10-17-24 @ 07:12) (72 - 73)  BP: 149/90 (10-17-24 @ 07:12) (149/90 - 150/90)  RR: 16 (10-17-24 @ 07:12) (16 - 16)  SpO2: 98% (10-17-24 @ 07:12) (98% - 99%)  Post-procedure VS: /77 HR 69 O2 sat 100% RR 16     Physical exam:   awake, alert, no obvious distress   Card: S1/S2, RRR, no m/g/r  Resp: lungs CTA b/l  Abd: S/NT/ND  Groins: hemostatic sutures in place; sites C/D/I; no bleeding, hematoma, erythema, exudate or edema  Ext: no edema; distal pulses intact  Skin: warm, dry, no rash     ECG: NSR 77 bpm, iRBBB    PAMELA: CONCLUSIONS:   1. Left ventricular systolic function is normal with an ejection fraction visually estimated at 55 to 60 %.   2. Normal right ventricular systolic function.   3. Mild mitral regurgitation.   4. No pericardial effusion seen.   5. No evidence of a patent foramen ovale with no shunt detected by color flow Doppler.   6. Findings were discussed with EP Dr. Cody on 10/17/2024. No prior echocardiogram is available for comparison.   7. No evidence of left atrial or left atrial appendage thrombus. The left atrial appendage emptying velocity is normal.    Assessment:   69 year old male with DM type 2, hx of VSD repair in childhood, GERD and Almanzar's esophagus, chronic back pain, s/p cervical spine fusion, atrial flutter s/p CT ablation 2022, and symptomatic paroxysmal atrial fibrillation s/p MDT ILR 9/14/22. He presented electively and is now status post uncomplicated FARAPULSE pulsed field ablation of atrial fibrillation (PVI, PW). Resting comfortably.     Plan:   Bedrest x 4 hours, then OOB with assistance and progress as tolerated.   Groin sutures to be removed by EP service in AM.   Pending groin status, resume Xarelto 20mg daily w/ dinner @ 16:00   DO NOT HOLD, INTERRUPT OR REVERSE ANTICOAGULATION WITHOUT EXPLICIT APPROVAL FROM EP SERVICE.   Continue Multaq.    Start Protonix 40mg once daily for 1 week.   Continue other home medications.   Strict I/Os.  Please encourage incentive spirometry and ambulation once able.  Observation and monitoring on telemetry overnight with anticipated discharge in the AM and outpt follow up in 2-4 weeks.

## 2024-10-17 NOTE — ASU PATIENT PROFILE, ADULT - MENTAL HEALTH CONDITIONS/SYMPTOMS, PROFILE
Pt resting with no complaints  Vitals stable; labs within parameters for treatment  Callbell within reach; will continue to monitor 
Pt tolerated treatment well  Pt declined AVS  Aware of next appt 
none

## 2024-10-17 NOTE — DISCHARGE NOTE PROVIDER - NSDCQMCOGNITION_NEU_ALL_CORE
No difficulties anticipated discharge recommendation/impairments found/functional limitations in following categories/predicted duration of therapy intervention/risk reduction/prevention/rehab potential/therapy frequency/anticipated equipment needs at discharge

## 2024-10-18 ENCOUNTER — TRANSCRIPTION ENCOUNTER (OUTPATIENT)
Age: 69
End: 2024-10-18

## 2024-10-18 VITALS
TEMPERATURE: 98 F | HEART RATE: 67 BPM | SYSTOLIC BLOOD PRESSURE: 121 MMHG | RESPIRATION RATE: 18 BRPM | OXYGEN SATURATION: 97 % | DIASTOLIC BLOOD PRESSURE: 63 MMHG

## 2024-10-18 LAB
ANION GAP SERPL CALC-SCNC: 13 MMOL/L — SIGNIFICANT CHANGE UP (ref 5–17)
BUN SERPL-MCNC: 23.9 MG/DL — HIGH (ref 8–20)
CALCIUM SERPL-MCNC: 8.4 MG/DL — SIGNIFICANT CHANGE UP (ref 8.4–10.5)
CHLORIDE SERPL-SCNC: 104 MMOL/L — SIGNIFICANT CHANGE UP (ref 96–108)
CO2 SERPL-SCNC: 23 MMOL/L — SIGNIFICANT CHANGE UP (ref 22–29)
CREAT SERPL-MCNC: 0.95 MG/DL — SIGNIFICANT CHANGE UP (ref 0.5–1.3)
EGFR: 86 ML/MIN/1.73M2 — SIGNIFICANT CHANGE UP
GLUCOSE BLDC GLUCOMTR-MCNC: 139 MG/DL — HIGH (ref 70–99)
GLUCOSE SERPL-MCNC: 143 MG/DL — HIGH (ref 70–99)
HCT VFR BLD CALC: 36.3 % — LOW (ref 39–50)
HGB BLD-MCNC: 11.9 G/DL — LOW (ref 13–17)
MAGNESIUM SERPL-MCNC: 1.8 MG/DL — SIGNIFICANT CHANGE UP (ref 1.6–2.6)
MCHC RBC-ENTMCNC: 29.3 PG — SIGNIFICANT CHANGE UP (ref 27–34)
MCHC RBC-ENTMCNC: 32.8 GM/DL — SIGNIFICANT CHANGE UP (ref 32–36)
MCV RBC AUTO: 89.4 FL — SIGNIFICANT CHANGE UP (ref 80–100)
PLATELET # BLD AUTO: 176 K/UL — SIGNIFICANT CHANGE UP (ref 150–400)
POTASSIUM SERPL-MCNC: 4.4 MMOL/L — SIGNIFICANT CHANGE UP (ref 3.5–5.3)
POTASSIUM SERPL-SCNC: 4.4 MMOL/L — SIGNIFICANT CHANGE UP (ref 3.5–5.3)
RBC # BLD: 4.06 M/UL — LOW (ref 4.2–5.8)
RBC # FLD: 14.3 % — SIGNIFICANT CHANGE UP (ref 10.3–14.5)
SODIUM SERPL-SCNC: 140 MMOL/L — SIGNIFICANT CHANGE UP (ref 135–145)
WBC # BLD: 10.25 K/UL — SIGNIFICANT CHANGE UP (ref 3.8–10.5)
WBC # FLD AUTO: 10.25 K/UL — SIGNIFICANT CHANGE UP (ref 3.8–10.5)

## 2024-10-18 PROCEDURE — 85027 COMPLETE CBC AUTOMATED: CPT

## 2024-10-18 PROCEDURE — 93325 DOPPLER ECHO COLOR FLOW MAPG: CPT

## 2024-10-18 PROCEDURE — 93656 COMPRE EP EVAL ABLTJ ATR FIB: CPT

## 2024-10-18 PROCEDURE — 82962 GLUCOSE BLOOD TEST: CPT

## 2024-10-18 PROCEDURE — 36415 COLL VENOUS BLD VENIPUNCTURE: CPT

## 2024-10-18 PROCEDURE — C1759: CPT

## 2024-10-18 PROCEDURE — 85610 PROTHROMBIN TIME: CPT

## 2024-10-18 PROCEDURE — C1887: CPT

## 2024-10-18 PROCEDURE — C1731: CPT

## 2024-10-18 PROCEDURE — 93010 ELECTROCARDIOGRAM REPORT: CPT

## 2024-10-18 PROCEDURE — 99232 SBSQ HOSP IP/OBS MODERATE 35: CPT

## 2024-10-18 PROCEDURE — 80048 BASIC METABOLIC PNL TOTAL CA: CPT

## 2024-10-18 PROCEDURE — 85730 THROMBOPLASTIN TIME PARTIAL: CPT

## 2024-10-18 PROCEDURE — C9399: CPT

## 2024-10-18 PROCEDURE — 93312 ECHO TRANSESOPHAGEAL: CPT

## 2024-10-18 PROCEDURE — C1769: CPT

## 2024-10-18 PROCEDURE — C1766: CPT

## 2024-10-18 PROCEDURE — C1732: CPT

## 2024-10-18 PROCEDURE — 93005 ELECTROCARDIOGRAM TRACING: CPT

## 2024-10-18 PROCEDURE — 86900 BLOOD TYPING SEROLOGIC ABO: CPT

## 2024-10-18 PROCEDURE — 86850 RBC ANTIBODY SCREEN: CPT

## 2024-10-18 PROCEDURE — 86901 BLOOD TYPING SEROLOGIC RH(D): CPT

## 2024-10-18 PROCEDURE — C1733: CPT

## 2024-10-18 PROCEDURE — C1894: CPT

## 2024-10-18 PROCEDURE — 93320 DOPPLER ECHO COMPLETE: CPT

## 2024-10-18 PROCEDURE — 83735 ASSAY OF MAGNESIUM: CPT

## 2024-10-18 RX ORDER — MAGNESIUM SULFATE 500 MG/ML
2 VIAL (ML) INJECTION ONCE
Refills: 0 | Status: COMPLETED | OUTPATIENT
Start: 2024-10-18 | End: 2024-10-18

## 2024-10-18 RX ADMIN — Medication 25 GRAM(S): at 08:15

## 2024-10-18 RX ADMIN — PANTOPRAZOLE SODIUM 40 MILLIGRAM(S): 40 TABLET, DELAYED RELEASE ORAL at 05:19

## 2024-10-18 NOTE — DISCHARGE NOTE NURSING/CASE MANAGEMENT/SOCIAL WORK - PATIENT PORTAL LINK FT
You can access the FollowMyHealth Patient Portal offered by [facility] by registering at the following website: [email or web address]. By joining [facility]’s FollowMyHealth portal, you will also be able to view your health information using other applications (apps) compatible with our system.

## 2024-10-18 NOTE — DISCHARGE NOTE NURSING/CASE MANAGEMENT/SOCIAL WORK - FINANCIAL ASSISTANCE
[facility] provides services at a reduced cost to those who are determined to be eligible through [facility]’s financial assistance program. Information regarding [facility]’s financial assistance program can be found by going to [email or web address] or by calling [phone].

## 2024-10-18 NOTE — DISCHARGE NOTE NURSING/CASE MANAGEMENT/SOCIAL WORK - NSDCPETBCESMAN_GEN_ALL_CORE
If you are a smoker, it is important for your health to stop smoking. Please be aware that second hand smoke is also harmful.
Clear

## 2024-10-18 NOTE — PROGRESS NOTE ADULT - SUBJECTIVE AND OBJECTIVE BOX
Pt doing well POD #1 s/p atrial fibrillation ablation. No overnight events noted, pt denies complaint. Bilateral groin sutures removed without difficulty, pt tolerated well.      EKG: NSR 65 bpm   TELE: sinus rhythm, no events     PAST MEDICAL & SURGICAL HISTORY:  Atrial fibrillation  Atrial flutter  Almanzar esophagus  GERD (gastroesophageal reflux disease)  Type 2 diabetes mellitus  Chronic back pain  History of fusion of cervical spine  VSD (ventricular septal defect)  S/P ablation of atrial flutter  S/P VSD repair  History of loop recorder  H/O shoulder surgery    MEDICATIONS  (STANDING):  dextrose 5%. 1000 milliLiter(s) (100 mL/Hr) IV Continuous <Continuous>  dextrose 5%. 1000 milliLiter(s) (50 mL/Hr) IV Continuous <Continuous>  dextrose 50% Injectable 12.5 Gram(s) IV Push once  dextrose 50% Injectable 25 Gram(s) IV Push once  dextrose 50% Injectable 25 Gram(s) IV Push once  dronedarone 400 milliGRAM(s) Oral two times a day  glucagon  Injectable 1 milliGRAM(s) IntraMuscular once  insulin lispro (ADMELOG) corrective regimen sliding scale   SubCutaneous three times a day before meals  magnesium sulfate  IVPB 2 Gram(s) IV Intermittent once  metFORMIN 500 milliGRAM(s) Oral two times a day  pantoprazole    Tablet 40 milliGRAM(s) Oral before breakfast  rivaroxaban 20 milliGRAM(s) Oral with dinner  sodium chloride 0.9%. 1000 milliLiter(s) (50 mL/Hr) IV Continuous <Continuous>    MEDICATIONS  (PRN):  acetaminophen     Tablet .. 650 milliGRAM(s) Oral every 6 hours PRN Mild Pain (1 - 3), Moderate Pain (4 - 6)  aluminum hydroxide/magnesium hydroxide/simethicone Suspension 30 milliLiter(s) Oral every 4 hours PRN Dyspepsia  benzocaine/menthol Lozenge 1 Lozenge Oral every 2 hours PRN Sore Throat  dextrose Oral Gel 15 Gram(s) Oral once PRN Blood Glucose LESS THAN 70 milliGRAM(s)/deciliter  ketorolac   Injectable 15 milliGRAM(s) IV Push every 6 hours PRN Moderate Pain (4 - 6)    Allergies:  penicillin (Hives)    Vital Signs Last 24 Hrs  T(C): 36.7 (18 Oct 2024 05:30), Max: 36.7 (17 Oct 2024 17:25)  T(F): 98.1 (18 Oct 2024 05:30), Max: 98.1 (18 Oct 2024 05:30)  HR: 73 (18 Oct 2024 05:30) (65 - 87)  BP: 120/61 (18 Oct 2024 05:30) (107/63 - 151/77)  BP(mean): 78 (17 Oct 2024 21:43) (78 - 90)  RR: 18 (18 Oct 2024 05:30) (15 - 20)  SpO2: 98% (18 Oct 2024 05:30) (94% - 100%)    Parameters below as of 18 Oct 2024 05:30  Patient On (Oxygen Delivery Method): room air    Physical Exam:  Constitutional: awake, alert, no obvious distress   Cardiovascular: +S1S2 RRR  Pulmonary: CTA b/l, unlabored  Abd: soft NTND +BS  Groins: C/D/I bilaterally; no bleeding, hematoma, edema  Extremities: no pedal edema, +distal pulses b/l  Skin: warm and dry, no rash   Neuro: CN II-XII grossly intact    LABS:                        11.9   10.25 )-----------( 176      ( 18 Oct 2024 05:10 )             36.3     10-18    140  |  104  |  23.9[H]  ----------------------------<  143[H]  4.4   |  23.0  |  0.95    Ca    8.4      18 Oct 2024 05:10  Mg     1.8     10-18    PT/INR - ( 17 Oct 2024 07:00 )   PT: 12.0 sec;   INR: 1.03 ratio       PTT - ( 17 Oct 2024 07:00 )  PTT:33.5 sec  Urinalysis Basic - ( 18 Oct 2024 05:10 )    Color: x / Appearance: x / SG: x / pH: x  Gluc: 143 mg/dL / Ketone: x  / Bili: x / Urobili: x   Blood: x / Protein: x / Nitrite: x   Leuk Esterase: x / RBC: x / WBC x   Sq Epi: x / Non Sq Epi: x / Bacteria: x    Assessment:   69 year old male with DM type 2, hx of VSD repair in childhood, GERD and Almanzar's esophagus, chronic back pain, s/p cervical spine fusion, atrial flutter s/p CT ablation 2022, and symptomatic paroxysmal atrial fibrillation s/p MDT ILR 9/14/22. He presented electively and is now POD#1 s/p uncomplicated FARAPULSE pulsed field ablation of atrial fibrillation (PVI, PW).     Plan:   Xarelto 20mg daily w/ dinner @ 16:00   DO NOT HOLD, INTERRUPT OR REVERSE ANTICOAGULATION WITHOUT EXPLICIT APPROVAL FROM EP SERVICE.   Continue Multaq.    Recommend daily omeprazole 40mg once daily for 1 week.   Continue other home medications.  Pt instructed as to activity limitations - no lifting/pushing/pulling >10 lbs or strenuous exercise x 1 week.   Pt instructed as to access site care and f/up - written instructions included in d/c documents.  Stable for d/c home.   Outpt f/up in 2-4 weeks. The office will contact patient to schedule.      12-Jul-2018 21:00

## 2024-10-25 ENCOUNTER — NON-APPOINTMENT (OUTPATIENT)
Age: 69
End: 2024-10-25

## 2024-10-25 ENCOUNTER — APPOINTMENT (OUTPATIENT)
Dept: ELECTROPHYSIOLOGY | Facility: CLINIC | Age: 69
End: 2024-10-25
Payer: MEDICARE

## 2024-10-25 VITALS
HEIGHT: 73 IN | DIASTOLIC BLOOD PRESSURE: 60 MMHG | HEART RATE: 72 BPM | WEIGHT: 217 LBS | BODY MASS INDEX: 28.76 KG/M2 | SYSTOLIC BLOOD PRESSURE: 132 MMHG | OXYGEN SATURATION: 96 %

## 2024-10-25 DIAGNOSIS — Z86.39 PERSONAL HISTORY OF OTHER ENDOCRINE, NUTRITIONAL AND METABOLIC DISEASE: ICD-10-CM

## 2024-10-25 DIAGNOSIS — Z86.79 OTHER SPECIFIED POSTPROCEDURAL STATES: ICD-10-CM

## 2024-10-25 DIAGNOSIS — Z98.890 OTHER SPECIFIED POSTPROCEDURAL STATES: ICD-10-CM

## 2024-10-25 DIAGNOSIS — K21.9 GASTRO-ESOPHAGEAL REFLUX DISEASE W/OUT ESOPHAGITIS: ICD-10-CM

## 2024-10-25 PROBLEM — I48.92 UNSPECIFIED ATRIAL FLUTTER: Chronic | Status: ACTIVE | Noted: 2024-10-17

## 2024-10-25 PROBLEM — I48.91 UNSPECIFIED ATRIAL FIBRILLATION: Chronic | Status: ACTIVE | Noted: 2024-10-17

## 2024-10-25 PROBLEM — E11.9 TYPE 2 DIABETES MELLITUS WITHOUT COMPLICATIONS: Chronic | Status: ACTIVE | Noted: 2024-10-17

## 2024-10-25 PROBLEM — Z98.1 ARTHRODESIS STATUS: Chronic | Status: ACTIVE | Noted: 2024-10-17

## 2024-10-25 PROBLEM — M54.9 DORSALGIA, UNSPECIFIED: Chronic | Status: ACTIVE | Noted: 2024-10-17

## 2024-10-25 PROBLEM — Q21.0 VENTRICULAR SEPTAL DEFECT: Chronic | Status: ACTIVE | Noted: 2024-10-17

## 2024-10-25 PROBLEM — K22.70 BARRETT'S ESOPHAGUS WITHOUT DYSPLASIA: Chronic | Status: ACTIVE | Noted: 2024-10-17

## 2024-10-25 LAB — HBA1C MFR BLD HPLC: 6.9

## 2024-10-25 PROCEDURE — 99213 OFFICE O/P EST LOW 20 MIN: CPT

## 2024-10-25 PROCEDURE — 93000 ELECTROCARDIOGRAM COMPLETE: CPT

## 2024-10-30 ENCOUNTER — NON-APPOINTMENT (OUTPATIENT)
Age: 69
End: 2024-10-30

## 2024-10-30 ENCOUNTER — APPOINTMENT (OUTPATIENT)
Dept: CARDIOLOGY | Facility: CLINIC | Age: 69
End: 2024-10-30
Payer: MEDICARE

## 2024-10-30 PROBLEM — Z86.39 HISTORY OF TYPE 2 DIABETES MELLITUS: Status: RESOLVED | Noted: 2018-04-19 | Resolved: 2024-10-30

## 2024-10-30 PROBLEM — Z98.890 S/P ABLATION OF ATRIAL FIBRILLATION: Status: ACTIVE | Noted: 2024-10-30

## 2024-10-30 PROCEDURE — 93298 REM INTERROG DEV EVAL SCRMS: CPT

## 2024-12-04 ENCOUNTER — APPOINTMENT (OUTPATIENT)
Dept: CARDIOLOGY | Facility: CLINIC | Age: 69
End: 2024-12-04

## 2024-12-04 PROCEDURE — 93298 REM INTERROG DEV EVAL SCRMS: CPT

## 2025-01-07 ENCOUNTER — APPOINTMENT (OUTPATIENT)
Dept: CARDIOLOGY | Facility: CLINIC | Age: 70
End: 2025-01-07
Payer: MEDICARE

## 2025-01-07 ENCOUNTER — NON-APPOINTMENT (OUTPATIENT)
Age: 70
End: 2025-01-07

## 2025-01-07 PROCEDURE — 93298 REM INTERROG DEV EVAL SCRMS: CPT

## 2025-02-11 ENCOUNTER — APPOINTMENT (OUTPATIENT)
Dept: CARDIOLOGY | Facility: CLINIC | Age: 70
End: 2025-02-11
Payer: MEDICARE

## 2025-02-11 ENCOUNTER — NON-APPOINTMENT (OUTPATIENT)
Age: 70
End: 2025-02-11

## 2025-02-11 PROCEDURE — 93298 REM INTERROG DEV EVAL SCRMS: CPT

## 2025-03-19 ENCOUNTER — NON-APPOINTMENT (OUTPATIENT)
Age: 70
End: 2025-03-19

## 2025-03-19 ENCOUNTER — APPOINTMENT (OUTPATIENT)
Dept: ELECTROPHYSIOLOGY | Facility: CLINIC | Age: 70
End: 2025-03-19
Payer: MEDICARE

## 2025-03-19 VITALS
HEART RATE: 91 BPM | WEIGHT: 212 LBS | HEIGHT: 73 IN | DIASTOLIC BLOOD PRESSURE: 68 MMHG | SYSTOLIC BLOOD PRESSURE: 142 MMHG | OXYGEN SATURATION: 97 % | BODY MASS INDEX: 28.1 KG/M2

## 2025-03-19 DIAGNOSIS — Z86.79 OTHER SPECIFIED POSTPROCEDURAL STATES: ICD-10-CM

## 2025-03-19 DIAGNOSIS — Z98.890 OTHER SPECIFIED POSTPROCEDURAL STATES: ICD-10-CM

## 2025-03-19 PROCEDURE — 99214 OFFICE O/P EST MOD 30 MIN: CPT

## 2025-03-20 ENCOUNTER — APPOINTMENT (OUTPATIENT)
Dept: CARDIOLOGY | Facility: CLINIC | Age: 70
End: 2025-03-20
Payer: MEDICARE

## 2025-03-20 PROCEDURE — 93242 EXT ECG>48HR<7D RECORDING: CPT

## 2025-03-20 PROCEDURE — 93306 TTE W/DOPPLER COMPLETE: CPT

## 2025-03-24 ENCOUNTER — APPOINTMENT (OUTPATIENT)
Dept: CARDIOLOGY | Facility: CLINIC | Age: 70
End: 2025-03-24
Payer: MEDICARE

## 2025-03-24 DIAGNOSIS — E78.5 HYPERLIPIDEMIA, UNSPECIFIED: ICD-10-CM

## 2025-03-24 DIAGNOSIS — R00.2 PALPITATIONS: ICD-10-CM

## 2025-03-24 DIAGNOSIS — I48.0 PAROXYSMAL ATRIAL FIBRILLATION: ICD-10-CM

## 2025-03-24 PROCEDURE — 93015 CV STRESS TEST SUPVJ I&R: CPT

## 2025-03-27 PROCEDURE — 93244 EXT ECG>48HR<7D REV&INTERPJ: CPT

## 2025-04-17 ENCOUNTER — RESULT REVIEW (OUTPATIENT)
Age: 70
End: 2025-04-17

## 2025-04-21 ENCOUNTER — APPOINTMENT (OUTPATIENT)
Dept: CARDIOLOGY | Facility: CLINIC | Age: 70
End: 2025-04-21
Payer: MEDICARE

## 2025-04-21 ENCOUNTER — NON-APPOINTMENT (OUTPATIENT)
Age: 70
End: 2025-04-21

## 2025-04-21 PROCEDURE — 93298 REM INTERROG DEV EVAL SCRMS: CPT

## 2025-04-22 ENCOUNTER — NON-APPOINTMENT (OUTPATIENT)
Age: 70
End: 2025-04-22

## 2025-04-22 ENCOUNTER — APPOINTMENT (OUTPATIENT)
Dept: CARDIOLOGY | Facility: CLINIC | Age: 70
End: 2025-04-22
Payer: MEDICARE

## 2025-04-22 VITALS
SYSTOLIC BLOOD PRESSURE: 130 MMHG | HEART RATE: 72 BPM | OXYGEN SATURATION: 94 % | BODY MASS INDEX: 29.42 KG/M2 | HEIGHT: 73 IN | DIASTOLIC BLOOD PRESSURE: 80 MMHG | WEIGHT: 222 LBS

## 2025-04-22 DIAGNOSIS — R06.09 OTHER FORMS OF DYSPNEA: ICD-10-CM

## 2025-04-22 DIAGNOSIS — I48.0 PAROXYSMAL ATRIAL FIBRILLATION: ICD-10-CM

## 2025-04-22 DIAGNOSIS — E78.5 HYPERLIPIDEMIA, UNSPECIFIED: ICD-10-CM

## 2025-04-22 PROBLEM — I25.118 CORONARY ARTERY DISEASE WITH OTHER FORM OF ANGINA PECTORIS: Status: ACTIVE | Noted: 2025-04-22

## 2025-04-22 PROCEDURE — 99215 OFFICE O/P EST HI 40 MIN: CPT

## 2025-04-22 RX ORDER — ASPIRIN 81 MG
81 TABLET, DELAYED RELEASE (ENTERIC COATED) ORAL
Refills: 0 | Status: ACTIVE | COMMUNITY

## 2025-04-22 RX ORDER — ATORVASTATIN CALCIUM 80 MG/1
80 TABLET, FILM COATED ORAL
Qty: 90 | Refills: 3 | Status: ACTIVE | COMMUNITY
Start: 2025-04-22 | End: 1900-01-01

## 2025-04-25 LAB
ANION GAP SERPL CALC-SCNC: 14 MMOL/L
BASOPHILS # BLD AUTO: 0.04 K/UL
BASOPHILS NFR BLD AUTO: 0.7 %
BUN SERPL-MCNC: 19 MG/DL
CALCIUM SERPL-MCNC: 9.8 MG/DL
CHLORIDE SERPL-SCNC: 104 MMOL/L
CHOLEST SERPL-MCNC: 204 MG/DL
CO2 SERPL-SCNC: 24 MMOL/L
CREAT SERPL-MCNC: 0.89 MG/DL
EGFRCR SERPLBLD CKD-EPI 2021: 92 ML/MIN/1.73M2
EOSINOPHIL # BLD AUTO: 0.33 K/UL
EOSINOPHIL NFR BLD AUTO: 5.5 %
GLUCOSE SERPL-MCNC: 169 MG/DL
HCT VFR BLD CALC: 45.2 %
HDLC SERPL-MCNC: 39 MG/DL
HGB BLD-MCNC: 14.9 G/DL
IMM GRANULOCYTES NFR BLD AUTO: 0.2 %
LDLC SERPL-MCNC: 106 MG/DL
LYMPHOCYTES # BLD AUTO: 2.21 K/UL
LYMPHOCYTES NFR BLD AUTO: 36.8 %
MAN DIFF?: NORMAL
MCHC RBC-ENTMCNC: 30.9 PG
MCHC RBC-ENTMCNC: 33 G/DL
MCV RBC AUTO: 93.8 FL
MONOCYTES # BLD AUTO: 0.56 K/UL
MONOCYTES NFR BLD AUTO: 9.3 %
NEUTROPHILS # BLD AUTO: 2.86 K/UL
NEUTROPHILS NFR BLD AUTO: 47.5 %
NONHDLC SERPL-MCNC: 165 MG/DL
PLATELET # BLD AUTO: 194 K/UL
POTASSIUM SERPL-SCNC: 4.9 MMOL/L
RBC # BLD: 4.82 M/UL
RBC # FLD: 14 %
SODIUM SERPL-SCNC: 143 MMOL/L
TRIGL SERPL-MCNC: 347 MG/DL
WBC # FLD AUTO: 6.01 K/UL

## 2025-04-28 ENCOUNTER — NON-APPOINTMENT (OUTPATIENT)
Age: 70
End: 2025-04-28

## 2025-04-28 ENCOUNTER — APPOINTMENT (OUTPATIENT)
Dept: CARDIOLOGY | Facility: CLINIC | Age: 70
End: 2025-04-28
Payer: MEDICARE

## 2025-04-28 VITALS
BODY MASS INDEX: 28.89 KG/M2 | HEIGHT: 73 IN | SYSTOLIC BLOOD PRESSURE: 128 MMHG | DIASTOLIC BLOOD PRESSURE: 66 MMHG | OXYGEN SATURATION: 96 % | HEART RATE: 84 BPM | WEIGHT: 218 LBS

## 2025-04-28 DIAGNOSIS — I48.92 UNSPECIFIED ATRIAL FLUTTER: ICD-10-CM

## 2025-04-28 DIAGNOSIS — Z86.79 OTHER SPECIFIED POSTPROCEDURAL STATES: ICD-10-CM

## 2025-04-28 DIAGNOSIS — I25.118 ATHEROSCLEROTIC HEART DISEASE OF NATIVE CORONARY ARTERY WITH OTHER FORMS OF ANGINA PECTORIS: ICD-10-CM

## 2025-04-28 DIAGNOSIS — Z86.39 PERSONAL HISTORY OF OTHER ENDOCRINE, NUTRITIONAL AND METABOLIC DISEASE: ICD-10-CM

## 2025-04-28 DIAGNOSIS — Z98.890 OTHER SPECIFIED POSTPROCEDURAL STATES: ICD-10-CM

## 2025-04-28 DIAGNOSIS — I34.0 NONRHEUMATIC MITRAL (VALVE) INSUFFICIENCY: ICD-10-CM

## 2025-04-28 PROCEDURE — 99214 OFFICE O/P EST MOD 30 MIN: CPT

## 2025-04-28 PROCEDURE — 93000 ELECTROCARDIOGRAM COMPLETE: CPT

## 2025-04-28 RX ORDER — METOPROLOL SUCCINATE 25 MG/1
25 TABLET, EXTENDED RELEASE ORAL DAILY
Refills: 0 | Status: ACTIVE | COMMUNITY

## 2025-04-28 RX ORDER — PANTOPRAZOLE 40 MG/1
40 TABLET, DELAYED RELEASE ORAL DAILY
Refills: 0 | Status: ACTIVE | COMMUNITY

## 2025-04-28 RX ORDER — TICAGRELOR 90 MG/1
90 TABLET ORAL TWICE DAILY
Refills: 0 | Status: ACTIVE | COMMUNITY

## 2025-05-23 ENCOUNTER — APPOINTMENT (OUTPATIENT)
Dept: CARDIOLOGY | Facility: CLINIC | Age: 70
End: 2025-05-23
Payer: MEDICARE

## 2025-05-23 ENCOUNTER — NON-APPOINTMENT (OUTPATIENT)
Age: 70
End: 2025-05-23

## 2025-05-23 PROCEDURE — 93298 REM INTERROG DEV EVAL SCRMS: CPT

## 2025-05-27 ENCOUNTER — APPOINTMENT (OUTPATIENT)
Dept: CARDIOLOGY | Facility: CLINIC | Age: 70
End: 2025-05-27
Payer: MEDICARE

## 2025-05-27 VITALS
SYSTOLIC BLOOD PRESSURE: 122 MMHG | WEIGHT: 220 LBS | OXYGEN SATURATION: 97 % | HEIGHT: 73 IN | DIASTOLIC BLOOD PRESSURE: 72 MMHG | BODY MASS INDEX: 29.16 KG/M2 | HEART RATE: 71 BPM

## 2025-05-27 DIAGNOSIS — I48.92 UNSPECIFIED ATRIAL FLUTTER: ICD-10-CM

## 2025-05-27 DIAGNOSIS — I25.118 ATHEROSCLEROTIC HEART DISEASE OF NATIVE CORONARY ARTERY WITH OTHER FORMS OF ANGINA PECTORIS: ICD-10-CM

## 2025-05-27 DIAGNOSIS — I48.0 PAROXYSMAL ATRIAL FIBRILLATION: ICD-10-CM

## 2025-05-27 DIAGNOSIS — E78.5 HYPERLIPIDEMIA, UNSPECIFIED: ICD-10-CM

## 2025-05-27 PROCEDURE — 99214 OFFICE O/P EST MOD 30 MIN: CPT

## 2025-05-27 PROCEDURE — G2211 COMPLEX E/M VISIT ADD ON: CPT

## 2025-06-27 ENCOUNTER — APPOINTMENT (OUTPATIENT)
Dept: CARDIOLOGY | Facility: CLINIC | Age: 70
End: 2025-06-27

## 2025-06-27 ENCOUNTER — NON-APPOINTMENT (OUTPATIENT)
Age: 70
End: 2025-06-27

## 2025-06-27 PROCEDURE — 93298 REM INTERROG DEV EVAL SCRMS: CPT

## 2025-08-01 ENCOUNTER — APPOINTMENT (OUTPATIENT)
Dept: CARDIOLOGY | Facility: CLINIC | Age: 70
End: 2025-08-01
Payer: MEDICARE

## 2025-08-01 ENCOUNTER — NON-APPOINTMENT (OUTPATIENT)
Age: 70
End: 2025-08-01

## 2025-08-01 LAB — HBA1C MFR BLD HPLC: 7.8

## 2025-08-01 PROCEDURE — 93298 REM INTERROG DEV EVAL SCRMS: CPT

## 2025-09-05 ENCOUNTER — APPOINTMENT (OUTPATIENT)
Dept: CARDIOLOGY | Facility: CLINIC | Age: 70
End: 2025-09-05
Payer: MEDICARE

## 2025-09-05 ENCOUNTER — APPOINTMENT (OUTPATIENT)
Dept: CARDIOLOGY | Facility: CLINIC | Age: 70
End: 2025-09-05

## 2025-09-05 ENCOUNTER — NON-APPOINTMENT (OUTPATIENT)
Age: 70
End: 2025-09-05

## 2025-09-05 VITALS
BODY MASS INDEX: 27.84 KG/M2 | HEART RATE: 70 BPM | DIASTOLIC BLOOD PRESSURE: 66 MMHG | WEIGHT: 211 LBS | SYSTOLIC BLOOD PRESSURE: 132 MMHG | OXYGEN SATURATION: 98 %

## 2025-09-05 DIAGNOSIS — I48.92 UNSPECIFIED ATRIAL FLUTTER: ICD-10-CM

## 2025-09-05 DIAGNOSIS — I25.118 ATHEROSCLEROTIC HEART DISEASE OF NATIVE CORONARY ARTERY WITH OTHER FORMS OF ANGINA PECTORIS: ICD-10-CM

## 2025-09-05 DIAGNOSIS — E78.5 HYPERLIPIDEMIA, UNSPECIFIED: ICD-10-CM

## 2025-09-05 DIAGNOSIS — I48.0 PAROXYSMAL ATRIAL FIBRILLATION: ICD-10-CM

## 2025-09-05 PROCEDURE — 99214 OFFICE O/P EST MOD 30 MIN: CPT

## 2025-09-05 PROCEDURE — G2211 COMPLEX E/M VISIT ADD ON: CPT

## 2025-09-05 PROCEDURE — 93298 REM INTERROG DEV EVAL SCRMS: CPT
